# Patient Record
Sex: FEMALE | Race: BLACK OR AFRICAN AMERICAN | Employment: UNEMPLOYED | URBAN - METROPOLITAN AREA
[De-identification: names, ages, dates, MRNs, and addresses within clinical notes are randomized per-mention and may not be internally consistent; named-entity substitution may affect disease eponyms.]

---

## 2018-02-02 ENCOUNTER — APPOINTMENT (OUTPATIENT)
Dept: CT IMAGING | Age: 43
DRG: 871 | End: 2018-02-02
Attending: EMERGENCY MEDICINE
Payer: MEDICARE

## 2018-02-02 ENCOUNTER — HOSPITAL ENCOUNTER (INPATIENT)
Age: 43
LOS: 5 days | Discharge: HOME HEALTH | DRG: 871 | End: 2018-02-08
Attending: EMERGENCY MEDICINE | Admitting: INTERNAL MEDICINE
Payer: MEDICARE

## 2018-02-02 DIAGNOSIS — D57.00 SICKLE CELL CRISIS (HCC): ICD-10-CM

## 2018-02-02 DIAGNOSIS — R65.10 SIRS (SYSTEMIC INFLAMMATORY RESPONSE SYNDROME) (HCC): ICD-10-CM

## 2018-02-02 DIAGNOSIS — I48.0 PAROXYSMAL ATRIAL FIBRILLATION (HCC): ICD-10-CM

## 2018-02-02 DIAGNOSIS — R56.9 SEIZURES (HCC): ICD-10-CM

## 2018-02-02 DIAGNOSIS — D64.9 ANEMIA, UNSPECIFIED TYPE: ICD-10-CM

## 2018-02-02 DIAGNOSIS — R56.9 SEIZURE (HCC): Primary | ICD-10-CM

## 2018-02-02 DIAGNOSIS — D57.00 HB-SS DISEASE WITH CRISIS (HCC): ICD-10-CM

## 2018-02-02 LAB
ALBUMIN SERPL-MCNC: 2.8 G/DL (ref 3.5–5)
ALBUMIN/GLOB SERPL: 0.5 {RATIO} (ref 1.1–2.2)
ALP SERPL-CCNC: 163 U/L (ref 45–117)
ALT SERPL-CCNC: 43 U/L (ref 12–78)
ANION GAP SERPL CALC-SCNC: 13 MMOL/L (ref 5–15)
AST SERPL-CCNC: 158 U/L (ref 15–37)
BASOPHILS # BLD: 0 K/UL (ref 0–0.1)
BASOPHILS NFR BLD: 0 % (ref 0–1)
BILIRUB SERPL-MCNC: 2.9 MG/DL (ref 0.2–1)
BUN SERPL-MCNC: 40 MG/DL (ref 6–20)
BUN/CREAT SERPL: 20 (ref 12–20)
CALCIUM SERPL-MCNC: 8.2 MG/DL (ref 8.5–10.1)
CHLORIDE SERPL-SCNC: 103 MMOL/L (ref 97–108)
CO2 SERPL-SCNC: 21 MMOL/L (ref 21–32)
CREAT SERPL-MCNC: 2.04 MG/DL (ref 0.55–1.02)
DIFFERENTIAL METHOD BLD: ABNORMAL
EOSINOPHIL # BLD: 0 K/UL (ref 0–0.4)
EOSINOPHIL NFR BLD: 0 % (ref 0–7)
ERYTHROCYTE [DISTWIDTH] IN BLOOD BY AUTOMATED COUNT: 20.6 % (ref 11.5–14.5)
GLOBULIN SER CALC-MCNC: 5.2 G/DL (ref 2–4)
GLUCOSE SERPL-MCNC: 117 MG/DL (ref 65–100)
HCT VFR BLD AUTO: 17.8 % (ref 35–47)
HGB BLD-MCNC: 6 G/DL (ref 11.5–16)
IMM GRANULOCYTES # BLD: 0.6 K/UL (ref 0–0.04)
IMM GRANULOCYTES NFR BLD AUTO: 3 % (ref 0–0.5)
LDH SERPL L TO P-CCNC: 679 U/L (ref 81–246)
LYMPHOCYTES # BLD: 2.7 K/UL (ref 0.8–3.5)
LYMPHOCYTES NFR BLD: 13 % (ref 12–49)
MCH RBC QN AUTO: 25.1 PG (ref 26–34)
MCHC RBC AUTO-ENTMCNC: 33.7 G/DL (ref 30–36.5)
MCV RBC AUTO: 74.5 FL (ref 80–99)
MONOCYTES # BLD: 1.2 K/UL (ref 0–1)
MONOCYTES NFR BLD: 6 % (ref 5–13)
NEUTS SEG # BLD: 16 K/UL (ref 1.8–8)
NEUTS SEG NFR BLD: 78 % (ref 32–75)
NRBC # BLD: 0.06 K/UL (ref 0–0.01)
NRBC BLD-RTO: 0.3 PER 100 WBC
PLATELET # BLD AUTO: 425 K/UL (ref 150–400)
PMV BLD AUTO: 10.8 FL (ref 8.9–12.9)
POTASSIUM SERPL-SCNC: 3.6 MMOL/L (ref 3.5–5.1)
PROT SERPL-MCNC: 8 G/DL (ref 6.4–8.2)
RBC # BLD AUTO: 2.39 M/UL (ref 3.8–5.2)
RBC MORPH BLD: ABNORMAL
RETICS # AUTO: 0.19 M/UL (ref 0.02–0.08)
RETICS/RBC NFR AUTO: 8.1 % (ref 0.7–2.1)
SODIUM SERPL-SCNC: 137 MMOL/L (ref 136–145)
WBC # BLD AUTO: 20.5 K/UL (ref 3.6–11)

## 2018-02-02 PROCEDURE — 93005 ELECTROCARDIOGRAM TRACING: CPT

## 2018-02-02 PROCEDURE — 96365 THER/PROPH/DIAG IV INF INIT: CPT

## 2018-02-02 PROCEDURE — 86870 RBC ANTIBODY IDENTIFICATION: CPT | Performed by: EMERGENCY MEDICINE

## 2018-02-02 PROCEDURE — 99285 EMERGENCY DEPT VISIT HI MDM: CPT

## 2018-02-02 PROCEDURE — 85025 COMPLETE CBC W/AUTO DIFF WBC: CPT | Performed by: EMERGENCY MEDICINE

## 2018-02-02 PROCEDURE — 96361 HYDRATE IV INFUSION ADD-ON: CPT

## 2018-02-02 PROCEDURE — 85045 AUTOMATED RETICULOCYTE COUNT: CPT | Performed by: EMERGENCY MEDICINE

## 2018-02-02 PROCEDURE — 86880 COOMBS TEST DIRECT: CPT | Performed by: EMERGENCY MEDICINE

## 2018-02-02 PROCEDURE — 86850 RBC ANTIBODY SCREEN: CPT | Performed by: EMERGENCY MEDICINE

## 2018-02-02 PROCEDURE — 96375 TX/PRO/DX INJ NEW DRUG ADDON: CPT

## 2018-02-02 PROCEDURE — 36415 COLL VENOUS BLD VENIPUNCTURE: CPT | Performed by: EMERGENCY MEDICINE

## 2018-02-02 PROCEDURE — 74011000258 HC RX REV CODE- 258: Performed by: EMERGENCY MEDICINE

## 2018-02-02 PROCEDURE — 74011250636 HC RX REV CODE- 250/636

## 2018-02-02 PROCEDURE — 74011250636 HC RX REV CODE- 250/636: Performed by: EMERGENCY MEDICINE

## 2018-02-02 PROCEDURE — 86920 COMPATIBILITY TEST SPIN: CPT | Performed by: EMERGENCY MEDICINE

## 2018-02-02 PROCEDURE — 70450 CT HEAD/BRAIN W/O DYE: CPT

## 2018-02-02 PROCEDURE — 80053 COMPREHEN METABOLIC PANEL: CPT | Performed by: EMERGENCY MEDICINE

## 2018-02-02 PROCEDURE — 83615 LACTATE (LD) (LDH) ENZYME: CPT | Performed by: EMERGENCY MEDICINE

## 2018-02-02 PROCEDURE — 86902 BLOOD TYPE ANTIGEN DONOR EA: CPT | Performed by: EMERGENCY MEDICINE

## 2018-02-02 RX ORDER — MORPHINE SULFATE 100 MG/1
100 TABLET, FILM COATED, EXTENDED RELEASE ORAL EVERY 12 HOURS
COMMUNITY
End: 2018-02-08

## 2018-02-02 RX ORDER — LORAZEPAM 2 MG/ML
2 INJECTION INTRAMUSCULAR
Status: COMPLETED | OUTPATIENT
Start: 2018-02-02 | End: 2018-02-02

## 2018-02-02 RX ORDER — OXYCODONE HYDROCHLORIDE 30 MG/1
30 TABLET ORAL
COMMUNITY
End: 2018-02-08

## 2018-02-02 RX ORDER — LORAZEPAM 2 MG/ML
INJECTION INTRAMUSCULAR
Status: COMPLETED
Start: 2018-02-02 | End: 2018-02-02

## 2018-02-02 RX ORDER — SODIUM CHLORIDE 9 MG/ML
250 INJECTION, SOLUTION INTRAVENOUS AS NEEDED
Status: DISCONTINUED | OUTPATIENT
Start: 2018-02-02 | End: 2018-02-03 | Stop reason: SDUPTHER

## 2018-02-02 RX ADMIN — AMIODARONE HYDROCHLORIDE 1 MG/MIN: 50 INJECTION, SOLUTION INTRAVENOUS at 23:46

## 2018-02-02 RX ADMIN — LORAZEPAM 2 MG: 2 INJECTION INTRAMUSCULAR at 21:49

## 2018-02-02 RX ADMIN — LORAZEPAM 2 MG: 2 INJECTION INTRAMUSCULAR; INTRAVENOUS at 21:49

## 2018-02-02 RX ADMIN — AMIODARONE HYDROCHLORIDE 150 MG: 50 INJECTION, SOLUTION INTRAVENOUS at 23:30

## 2018-02-02 RX ADMIN — SODIUM CHLORIDE 1000 ML: 900 INJECTION, SOLUTION INTRAVENOUS at 22:09

## 2018-02-02 NOTE — IP AVS SNAPSHOT
[facility] [address] [facility] 
[phone] Patient: Vic Perdue MRN: [id number] :1975 About your hospitalization You were admitted on:  February 3, 2018 You last received care in the:  hospitals 2 PROGRESSIVE CARE You were discharged on:  2018 Why you were hospitalized Your primary diagnosis was:  Not on File Your diagnoses also included:  Sepsis (Hcc), Sickle Cell Crisis (Hcc), Sickle Cell Anemia (Hcc), Leg Wound, Left, Abnormal Lfts, A-Fib (Hcc), Multiple Myeloma (Hcc), Seizures (Hcc) Follow-up Information Follow up With Details Comments Contact Info Mukesh Aleman MD Go on 2018 For new patient/ hospital follow up appointment at 12:15PM  [address] 
[phone] Janette Bledsoe MD Go on 3/8/2018 For hospital follow up appointment at 3:00PM  [address] [facility] 
[phone] [address] [facility] will see you for pt/ot/nursing and aide when discharged. [phone] Your Scheduled Appointments 2018 12:15 PM EST TRANSITIONAL CARE MANAGEMENT with Mukesh Aleman MD  
[facility] [address] [facility]  
[phone]   3:00 PM EST  
ESTABLISHED PATIENT with Janette Bledsoe MD  
[facility]) [address] [facility]  
[phone] Discharge Orders None A check angela indicates which time of day the medication should be taken. My Medications START taking these medications Instructions Each Dose to Equal  
 Morning Noon Evening Bedtime  
 amiodarone 400 mg tablet Commonly known as:  Lexi Cosby Your last dose was: Your next dose is: Take 1 Tab by mouth two (2) times a day. 400 mg  
    
   
   
   
  
 ciprofloxacin HCl 500 mg tablet Commonly known as:  CIPRO Your last dose was: Your next dose is: Take 1 Tab by mouth every twelve (12) hours for 8 days. 500 mg  
    
   
   
   
  
 clindamycin 300 mg capsule Commonly known as:  CLEOCIN Your last dose was: Your next dose is: Take 1 Cap by mouth every eight (8) hours for 8 days. 300 mg  
    
   
   
   
  
 levETIRAcetam 250 mg tablet Commonly known as:  KEPPRA Your last dose was: Your next dose is: Take 1 Tab by mouth two (2) times a day. 250 mg  
    
   
   
   
  
 ondansetron hcl 4 mg tablet Commonly known as:  ZOFRAN (AS HYDROCHLORIDE) Your last dose was: Your next dose is: Take 1 Tab by mouth every six (6) hours as needed for Nausea. 4 mg CHANGE how you take these medications Instructions Each Dose to Equal  
 Morning Noon Evening Bedtime  
 oxyCODONE IR 15 mg immediate release tablet Commonly known as:  OXY-IR What changed:   
- medication strength 
- how much to take 
- reasons to take this Your last dose was: Your next dose is: Take 1 Tab by mouth every four (4) hours as needed. Max Daily Amount: 90 mg.  
 15 mg CONTINUE taking these medications Instructions Each Dose to Equal  
 Morning Noon Evening Bedtime  
 folic acid 1 mg tablet Commonly known as:  Google Your last dose was: Your next dose is: Take 1 mg by mouth daily. 1 mg  
    
   
   
   
  
 gabapentin 300 mg capsule Commonly known as:  NEURONTIN Your last dose was: Your next dose is: Take 300 mg by mouth nightly. 300 mg  
    
   
   
   
  
  
STOP taking these medications   
 morphine  mg CR tablet Commonly known as:  MS CONTIN  
   
  
 rivaroxaban 20 mg Tab tablet Commonly known as:  Ubaldopaul Eddy Where to Get Your Medications Information on where to get these meds will be given to you by the nurse or doctor. ! Ask your nurse or doctor about these medications  
  amiodarone 400 mg tablet  
 ciprofloxacin HCl 500 mg tablet  
 clindamycin 300 mg capsule  
 levETIRAcetam 250 mg tablet  
 ondansetron hcl 4 mg tablet  
 oxyCODONE IR 15 mg immediate release tablet Discharge Instructions Sickle Cell Crisis: Care Instructions Your Care Instructions Sickle cell crisis is a painful episode that may begin suddenly in a person with sickle cell disease. Sickle cell disease turns normal, round red blood cells into cells that look like kendra or crescent moons. The sickle-shaped cells can get stuck in blood vessels, blocking blood flow and causing severe pain. The pain can occur in the bones of the spine, the arms and legs, the chest, and the abdomen. An episode may be called a \"painful event\" or \"painful crisis. \" Some people who have sickle cell disease have many painful events, while others have few or none. Treatment depends on the level of pain and how long it lasts. Sometimes taking nonprescription pain relievers can help. Or you may need stronger pain relief medicine that is prescribed or given by a doctor. You may need to be treated in the hospital. 
It isn't always possible to know what sets off a painful event. But triggers include being dehydrated, cold temperatures, infection, stress, and not getting enough oxygen. Follow-up care is a key part of your treatment and safety. Be sure to make and go to all appointments, and call your doctor if you are having problems. It's also a good idea to know your test results and keep a list of the medicines you take. How can you care for yourself at home? · Create a pain management plan with your doctor. This plan should include the types of medicines you can take and other actions you can take at home to relieve pain. · Drink plenty of fluids, enough so that your urine is light yellow or clear like water. If you have kidney, heart, or liver disease and have to limit fluids, talk with your doctor before you increase the amount of fluids you drink. · Take your medicines exactly as prescribed. Call your doctor if you think you are having a problem with your medicine. · Take pain medicines exactly as directed. ¨ If the doctor gave you a prescription medicine for pain, take it as prescribed. ¨ If you are not taking a prescription pain medicine, ask your doctor if you can take an over-the-counter medicine. · Avoid alcohol. It can make you dehydrated. · Dress warmly in cold weather. The cold and windy weather can lead to severe pain. · Do not smoke. Smoking can reduce the amount of oxygen in your blood. · Get plenty of sleep. When should you call for help? Call 911 anytime you think you may need emergency care. For example, call if: 
? · You have symptoms of a severe problem from sickle cell. ? · You have symptoms of a stroke. These may include: 
¨ Sudden numbness, tingling, weakness, or loss of movement in your face, arm, or leg, especially on only one side of your body. ¨ Sudden vision changes. ¨ Sudden trouble speaking. ¨ Sudden confusion or trouble understanding simple statements. ¨ Sudden problems with walking or balance. ¨ A sudden, severe headache that is different from past headaches. ? · You are in severe pain. ? · You have symptoms of a heart attack. These may include: ¨ Chest pain or pressure, or a strange feeling in the chest. 
¨ Sweating. ¨ Shortness of breath. ¨ Nausea or vomiting. ¨ Pain, pressure, or a strange feeling in the back, neck, jaw, or upper belly or in one or both shoulders or arms. ¨ Lightheadedness or sudden weakness. ¨ A fast or irregular heartbeat. After you call 911, the  may tell you to chew 1 adult-strength or 2 to 4 low-dose aspirin. Wait for an ambulance. Do not try to drive yourself. ?Call your doctor now or seek immediate medical care if: 
? · You have a fever. ? Watch closely for changes in your health, and be sure to contact your doctor if you have any problems. Where can you learn more? Go to http://leni-sydney.info/. Enter F104 in the search box to learn more about \"Sickle Cell Crisis: Care Instructions. \" Current as of: 2016 Content Version: 11.4 © 3700-8797 [employer] (Formerly [employer]). Care instructions adapted under license by [employer] (which disclaims liability or warranty for this information). If you have questions about a medical condition or this instruction, always ask your healthcare professional. [place] [address] any warranty or liability for your use of this information. [facility] Activation Thank you for requesting access to [facility]. Please follow the instructions below to securely access and download your online medical record. [facility] allows you to send messages to your doctor, view your test results, renew your prescriptions, schedule appointments, and more. How Do I Sign Up? 1. In your internet browser, go to [email or web address] 
2. Click on the First Time User? Click Here link in the Sign In box. You will be redirect to the New Member Sign Up page. 3. Enter your [facility] Access Code exactly as it appears below. You will not need to use this code after youve completed the sign-up process. If you do not sign up before the expiration date, you must request a new code. [facility] Access Code: [address] Expires: 2018 11:44 AM (This is the date your [facility] access code will ) 4.  Enter the last four digits of your Social Security Number (xxxx) and Date of Birth (mm/dd/yyyy) as indicated and click Submit. You will be taken to the next sign-up page. 5. Create a Kmsocial ID. This will be your [employer] login ID and cannot be changed, so think of one that is secure and easy to remember. 6. Create a ConsiderCt password. You can change your password at any time. 7. Enter your Password Reset Question and Answer. This can be used at a later time if you forget your password. 8. Enter your e-mail address. You will receive e-mail notification when new information is available in [address] 9. Click Sign Up. You can now view and download portions of your medical record. 10. Click the Download Summary menu link to download a portable copy of your medical information. Additional Information If you have questions, please visit the Frequently Asked Questions section of the [employer] website at [email or web address] Remember, Kms[employer] is NOT to be used for urgent needs. For medical emergencies, dial 911. [employer] Announcement We are excited to announce that we are making your provider's discharge notes available to you in Kmsocial. You will see these notes when they are completed and signed by the physician that discharged you from your recent hospital stay. If you have any questions or concerns about any information you see in Kmsocial, please call the Health Information Department where you were seen or reach out to your Primary Care Provider for more information about your plan of care. Introducing [facility]! Tahir Davis introduces Kmsocial patient portal. Now you can access parts of your medical record, email your doctor's office, and request medication refills online. 1. In your internet browser, go to [email or web address] 2. Click on the First Time User? Click Here link in the Sign In box. You will see the New Member Sign Up page. 3. Enter your [employer] Access Code exactly as it appears below. You will not need to use this code after youve completed the sign-up process. If you do not sign up before the expiration date, you must request a new code. · [employer] Access Code: [address] Expires: 5/9/2018 11:44 AM 
 
4. Enter the last four digits of your Social Security Number (xxxx) and Date of Birth (mm/dd/yyyy) as indicated and click Submit. You will be taken to the next sign-up page. 5. Create a [employer] ID. This will be your [employer] login ID and cannot be changed, so think of one that is secure and easy to remember. 6. Create a [employer] password. You can change your password at any time. 7. Enter your Password Reset Question and Answer. This can be used at a later time if you forget your password. 8. Enter your e-mail address. You will receive e-mail notification when new information is available in [address] 9. Click Sign Up. You can now view and download portions of your medical record. 10. Click the Download Summary menu link to download a portable copy of your medical information. If you have questions, please visit the Frequently Asked Questions section of the [employer] website. Remember, [employer] is NOT to be used for urgent needs. For medical emergencies, dial 911. Now available from your iPhone and Android! Unresulted Labs-Please follow up with your PCP about these lab tests Order Current Status CULTURE, BLOOD Preliminary result CULTURE, BLOOD Preliminary result Providers Seen During Your Hospitalization Provider Specialty Primary office phone Isiah Bentley. Mark Burr MD Emergency Medicine [phone] Bennie Celis MD Internal Medicine [phone] Landon Sebastian MD Internal Medicine [phone] Your Primary Care Physician (PCP) Primary Care Physician Office Phone Office Fax NONE ** None ** ** None ** You are allergic to the following Allergen Reactions Pcn (Penicillins) Hives Bactrim (Sulfamethoxazole-Trimethoprim) Nausea and Vomiting Silvadene (Silver Sulfadiazine) Hives Zantac (Ranitidine Hcl) Itching Recent Documentation Height Weight BMI OB Status Smoking Status 1.626 m 45.4 kg 17.17 kg/m2 Postmenopausal Never Smoker Emergency Contacts Name Discharge Info Relation Home Work Mobile Tiara Carrasco N/A  AT THIS TIME [6] Other Relative [6] [phone] Patient Belongings The following personal items are in your possession at time of discharge: 
  Dental Appliances: None  Visual Aid: At home, Contacts      Home Medications: None   Jewelry: None  Clothing: Shirt, Undergarments, Pants    Other Valuables: None Please provide this summary of care documentation to your next provider. Signatures-by signing, you are acknowledging that this After Visit Summary has been reviewed with you and you have received a copy. Patient Signature:  ____________________________________________________________ Date:  ____________________________________________________________  
  
Marlyse Leaks Provider Signature:  ____________________________________________________________ Date:  ____________________________________________________________

## 2018-02-02 NOTE — IP AVS SNAPSHOT
[address] 
[phone] Patient: Whitley Hernández MRN: [id number] :1975 About your hospitalization You were admitted on:  February 3, 2018 You last received care in the:  [facility] 2 PROGRESSIVE CARE You were discharged on:  2018 Why you were hospitalized Your primary diagnosis was:  Not on File Your diagnoses also included:  Sepsis (Hcc), Sickle Cell Crisis (Hcc), Sickle Cell Anemia (Hcc), Leg Wound, Left, Abnormal Lfts, A-Fib (Hcc), Multiple Myeloma (Hcc), Seizures (Hcc) Follow-up Information Follow up With Details Comments Contact Info Michelle Nagel MD Go on 2018 For new patient/ hospital follow up appointment at 12:15PM  [place] [address] [address] 
[phone] Jacques Miller MD Go on 3/8/2018 For hospital follow up appointment at 3:00PM  [address] [facility] [address] 
[phone] [address] [place] [address] will see you for pt/ot/nursing and aide when discharged. [phone] Your Scheduled Appointments 2018 12:15 PM EST TRANSITIONAL CARE MANAGEMENT with Michelle Nagel MD  
[facility] at [address]  
[phone]   3:00 PM EST  
ESTABLISHED PATIENT with Jacques Miller MD  
[facility] [address] [place] [address]) [address] [facility] [address]  
[phone] Discharge Orders None A check angela indicates which time of day the medication should be taken. My Medications START taking these medications Instructions Each Dose to Equal  
 Morning Noon Evening Bedtime  
 amiodarone 400 mg tablet Commonly known as:  Rashida Lacy Your last dose was: Your next dose is: Take 1 Tab by mouth two (2) times a day. 400 mg  
    
   
   
   
  
 ciprofloxacin HCl 500 mg tablet Commonly known as:  CIPRO Your last dose was: Your next dose is: Take 1 Tab by mouth every twelve (12) hours for 8 days. 500 mg  
    
   
   
   
  
 clindamycin 300 mg capsule Commonly known as:  CLEOCIN Your last dose was: Your next dose is: Take 1 Cap by mouth every eight (8) hours for 8 days. 300 mg  
    
   
   
   
  
 levETIRAcetam 250 mg tablet Commonly known as:  KEPPRA Your last dose was: Your next dose is: Take 1 Tab by mouth two (2) times a day. 250 mg  
    
   
   
   
  
 ondansetron hcl 4 mg tablet Commonly known as:  ZOFRAN (AS HYDROCHLORIDE) Your last dose was: Your next dose is: Take 1 Tab by mouth every six (6) hours as needed for Nausea. 4 mg CHANGE how you take these medications Instructions Each Dose to Equal  
 Morning Noon Evening Bedtime  
 oxyCODONE IR 15 mg immediate release tablet Commonly known as:  OXY-IR What changed:   
- medication strength 
- how much to take 
- reasons to take this Your last dose was: Your next dose is: Take 1 Tab by mouth every four (4) hours as needed. Max Daily Amount: 90 mg.  
 15 mg CONTINUE taking these medications Instructions Each Dose to Equal  
 Morning Noon Evening Bedtime  
 folic acid 1 mg tablet Commonly known as:  [employer] Your last dose was: Your next dose is: Take 1 mg by mouth daily. 1 mg  
    
   
   
   
  
 gabapentin 300 mg capsule Commonly known as:  NEURONTIN Your last dose was: Your next dose is: Take 300 mg by mouth nightly. 300 mg  
    
   
   
   
  
  
STOP taking these medications   
 morphine  mg CR tablet Commonly known as:  MS CONTIN  
   
  
 rivaroxaban 20 mg Tab tablet Commonly known as:  Tiffanie King Where to Get Your Medications Information on where to get these meds will be given to you by the nurse or doctor. ! Ask your nurse or doctor about these medications  
  amiodarone 400 mg tablet  
 ciprofloxacin HCl 500 mg tablet  
 clindamycin 300 mg capsule  
 levETIRAcetam 250 mg tablet  
 ondansetron hcl 4 mg tablet  
 oxyCODONE IR 15 mg immediate release tablet Discharge Instructions Sickle Cell Crisis: Care Instructions Your Care Instructions Sickle cell crisis is a painful episode that may begin suddenly in a person with sickle cell disease. Sickle cell disease turns normal, round red blood cells into cells that look like kendra or crescent moons. The sickle-shaped cells can get stuck in blood vessels, blocking blood flow and causing severe pain. The pain can occur in the bones of the spine, the arms and legs, the chest, and the abdomen. An episode may be called a \"painful event\" or \"painful crisis. \" Some people who have sickle cell disease have many painful events, while others have few or none. Treatment depends on the level of pain and how long it lasts. Sometimes taking nonprescription pain relievers can help. Or you may need stronger pain relief medicine that is prescribed or given by a doctor. You may need to be treated in the hospital. 
It isn't always possible to know what sets off a painful event. But triggers include being dehydrated, cold temperatures, infection, stress, and not getting enough oxygen. Follow-up care is a key part of your treatment and safety. Be sure to make and go to all appointments, and call your doctor if you are having problems. It's also a good idea to know your test results and keep a list of the medicines you take. How can you care for yourself at home? · Create a pain management plan with your doctor. This plan should include the types of medicines you can take and other actions you can take at home to relieve pain. · Drink plenty of fluids, enough so that your urine is light yellow or clear like water. If you have kidney, heart, or liver disease and have to limit fluids, talk with your doctor before you increase the amount of fluids you drink. · Take your medicines exactly as prescribed. Call your doctor if you think you are having a problem with your medicine. · Take pain medicines exactly as directed. ¨ If the doctor gave you a prescription medicine for pain, take it as prescribed. ¨ If you are not taking a prescription pain medicine, ask your doctor if you can take an over-the-counter medicine. · Avoid alcohol. It can make you dehydrated. · Dress warmly in cold weather. The cold and windy weather can lead to severe pain. · Do not smoke. Smoking can reduce the amount of oxygen in your blood. · Get plenty of sleep. When should you call for help? Call 911 anytime you think you may need emergency care. For example, call if: 
? · You have symptoms of a severe problem from sickle cell. ? · You have symptoms of a stroke. These may include: 
¨ Sudden numbness, tingling, weakness, or loss of movement in your face, arm, or leg, especially on only one side of your body. ¨ Sudden vision changes. ¨ Sudden trouble speaking. ¨ Sudden confusion or trouble understanding simple statements. ¨ Sudden problems with walking or balance. ¨ A sudden, severe headache that is different from past headaches. ? · You are in severe pain. ? · You have symptoms of a heart attack. These may include: ¨ Chest pain or pressure, or a strange feeling in the chest. 
¨ Sweating. ¨ Shortness of breath. ¨ Nausea or vomiting. ¨ Pain, pressure, or a strange feeling in the back, neck, jaw, or upper belly or in one or both shoulders or arms. ¨ Lightheadedness or sudden weakness. ¨ A fast or irregular heartbeat. After you call 911, the  may tell you to chew 1 adult-strength or 2 to 4 low-dose aspirin. Wait for an ambulance. Do not try to drive yourself. ?Call your doctor now or seek immediate medical care if: 
? · You have a fever. ? Watch closely for changes in your health, and be sure to contact your doctor if you have any problems. Where can you learn more? Go to http://leni-sydney.info/. Enter F104 in the search box to learn more about \"Sickle Cell Crisis: Care Instructions. \" Current as of: 2016 Content Version: 11.4 © 0976-9924 [employer]. Care instructions adapted under license by [employer] (which disclaims liability or warranty for this information). If you have questions about a medical condition or this instruction, always ask your healthcare professional. [place] [address] any warranty or liability for your use of this information. [employer] Activation Thank you for requesting access to [employer]. Please follow the instructions below to securely access and download your online medical record. [facility] allows you to send messages to your doctor, view your test results, renew your prescriptions, schedule appointments, and more. How Do I Sign Up? 1. In your internet browser, go to [email or web address] 
2. Click on the First Time User? Click Here link in the Sign In box. You will be redirect to the New Member Sign Up page. 3. Enter your [employer] Access Code exactly as it appears below. You will not need to use this code after youve completed the sign-up process. If you do not sign up before the expiration date, you must request a new code. [employer] Access Code: [address] Expires: 2018 11:44 AM (This is the date your [employer] access code will ) 4.  Enter the last four digits of your Social Security Number (xxxx) and Date of Birth (mm/dd/yyyy) as indicated and click Submit. You will be taken to the next sign-up page. 5. Create a Spunkmobile ID. This will be your Spunkmobile login ID and cannot be changed, so think of one that is secure and easy to remember. 6. Create a [employer] password. You can change your password at any time. 7. Enter your Password Reset Question and Answer. This can be used at a later time if you forget your password. 8. Enter your e-mail address. You will receive e-mail notification when new information is available in [address] 9. Click Sign Up. You can now view and download portions of your medical record. 10. Click the Download Summary menu link to download a portable copy of your medical information. Additional Information If you have questions, please visit the Frequently Asked Questions section of the Spunkmobile website at [email or web address] Remember, Spunkmobile is NOT to be used for urgent needs. For medical emergencies, dial 911. Spunkmobile Announcement We are excited to announce that we are making your provider's discharge notes available to you in Spunkmobile. You will see these notes when they are completed and signed by the physician that discharged you from your recent hospital stay. If you have any questions or concerns about any information you see in Spunkmobile, please call the Health Information Department where you were seen or reach out to your Primary Care Provider for more information about your plan of care. Introducing [facility]! [facility] introduces Spunkmobile patient portal. Now you can access parts of your medical record, email your doctor's office, and request medication refills online. 1. In your internet browser, go to [email or web address] 2. Click on the First Time User? Click Here link in the Sign In box. You will see the New Member Sign Up page. 3. Enter your [employer] Access Code exactly as it appears below. You will not need to use this code after youve completed the sign-up process. If you do not sign up before the expiration date, you must request a new code. · [employer] Access Code: [address] Expires: 5/9/2018 11:44 AM 
 
4. Enter the last four digits of your Social Security Number (xxxx) and Date of Birth (mm/dd/yyyy) as indicated and click Submit. You will be taken to the next sign-up page. 5. Create a Q[employer]anking ID. This will be your [employer] login ID and cannot be changed, so think of one that is secure and easy to remember. 6. Create a [employer]anking password. You can change your password at any time. 7. Enter your Password Reset Question and Answer. This can be used at a later time if you forget your password. 8. Enter your e-mail address. You will receive e-mail notification when new information is available in [address] 9. Click Sign Up. You can now view and download portions of your medical record. 10. Click the Download Summary menu link to download a portable copy of your medical information. If you have questions, please visit the Frequently Asked Questions section of the [employer] website. Remember, [employer] is NOT to be used for urgent needs. For medical emergencies, dial 911. Now available from your iPhone and Android! Unresulted Labs-Please follow up with your PCP about these lab tests Order Current Status CULTURE, BLOOD Preliminary result CULTURE, BLOOD Preliminary result Providers Seen During Your Hospitalization Provider Specialty Primary office phone Heather Rodriguez MD Emergency Medicine [phone] Viktoria Johnson MD Internal Medicine [phone] Mallorie Yan MD Internal Medicine [phone] Your Primary Care Physician (PCP) Primary Care Physician Office Phone Office Fax NONE ** None ** ** None ** You are allergic to the following Allergen Reactions Pcn (Penicillins) Hives Bactrim (Sulfamethoxazole-Trimethoprim) Nausea and Vomiting Silvadene (Silver Sulfadiazine) Hives Zantac (Ranitidine Hcl) Itching Recent Documentation Height Weight BMI OB Status Smoking Status 1.626 m 45.4 kg 17.17 kg/m2 Postmenopausal Never Smoker Emergency Contacts Name Discharge Info Relation Home Work Mobile Tiara Carrasco N/A  AT THIS TIME [6] Other Relative [6] [phone] Patient Belongings The following personal items are in your possession at time of discharge: 
  Dental Appliances: None  Visual Aid: At home, Contacts      Home Medications: None   Jewelry: None  Clothing: Shirt, Undergarments, Pants    Other Valuables: None Please provide this summary of care documentation to your next provider. Signatures-by signing, you are acknowledging that this After Visit Summary has been reviewed with you and you have received a copy. Patient Signature:  ____________________________________________________________ Date:  ____________________________________________________________  
  
Liz Medico Provider Signature:  ____________________________________________________________ Date:  ____________________________________________________________

## 2018-02-03 ENCOUNTER — APPOINTMENT (OUTPATIENT)
Dept: GENERAL RADIOLOGY | Age: 43
DRG: 871 | End: 2018-02-03
Attending: INTERNAL MEDICINE
Payer: MEDICARE

## 2018-02-03 PROBLEM — R56.9 SEIZURES (HCC): Status: ACTIVE | Noted: 2018-02-03

## 2018-02-03 PROBLEM — I48.91 A-FIB (HCC): Status: ACTIVE | Noted: 2018-02-03

## 2018-02-03 PROBLEM — R79.89 ABNORMAL LFTS: Status: ACTIVE | Noted: 2018-02-03

## 2018-02-03 PROBLEM — C90.00 MULTIPLE MYELOMA (HCC): Status: ACTIVE | Noted: 2018-02-03

## 2018-02-03 PROBLEM — S81.802A LEG WOUND, LEFT: Status: ACTIVE | Noted: 2018-02-03

## 2018-02-03 LAB
ALBUMIN SERPL-MCNC: 2.1 G/DL (ref 3.5–5)
ALBUMIN/GLOB SERPL: 0.5 {RATIO} (ref 1.1–2.2)
ALP SERPL-CCNC: 149 U/L (ref 45–117)
ALT SERPL-CCNC: 41 U/L (ref 12–78)
ANION GAP SERPL CALC-SCNC: 9 MMOL/L (ref 5–15)
APPEARANCE UR: ABNORMAL
AST SERPL-CCNC: 130 U/L (ref 15–37)
ATRIAL RATE: 136 BPM
BACTERIA URNS QL MICRO: NEGATIVE /HPF
BASOPHILS # BLD: 0 K/UL (ref 0–0.1)
BASOPHILS NFR BLD: 0 % (ref 0–1)
BILIRUB SERPL-MCNC: 2.2 MG/DL (ref 0.2–1)
BILIRUB UR QL CFM: NEGATIVE
BUN SERPL-MCNC: 38 MG/DL (ref 6–20)
BUN/CREAT SERPL: 26 (ref 12–20)
CALCIUM SERPL-MCNC: 7.3 MG/DL (ref 8.5–10.1)
CALCULATED R AXIS, ECG10: 51 DEGREES
CALCULATED T AXIS, ECG11: -10 DEGREES
CHLORIDE SERPL-SCNC: 108 MMOL/L (ref 97–108)
CO2 SERPL-SCNC: 22 MMOL/L (ref 21–32)
COLOR UR: ABNORMAL
CREAT SERPL-MCNC: 1.47 MG/DL (ref 0.55–1.02)
DIAGNOSIS, 93000: NORMAL
DIFFERENTIAL METHOD BLD: ABNORMAL
EOSINOPHIL # BLD: 0 K/UL (ref 0–0.4)
EOSINOPHIL NFR BLD: 0 % (ref 0–7)
EPITH CASTS URNS QL MICRO: ABNORMAL /LPF
ERYTHROCYTE [DISTWIDTH] IN BLOOD BY AUTOMATED COUNT: 20.8 % (ref 11.5–14.5)
GLOBULIN SER CALC-MCNC: 4.4 G/DL (ref 2–4)
GLUCOSE SERPL-MCNC: 97 MG/DL (ref 65–100)
GLUCOSE UR STRIP.AUTO-MCNC: NEGATIVE MG/DL
HCT VFR BLD AUTO: 15.2 % (ref 35–47)
HCT VFR BLD AUTO: 17.1 % (ref 35–47)
HCT VFR BLD AUTO: 20.8 % (ref 35–47)
HGB BLD-MCNC: 5.2 G/DL (ref 11.5–16)
HGB BLD-MCNC: 5.7 G/DL (ref 11.5–16)
HGB BLD-MCNC: 6.9 G/DL (ref 11.5–16)
HGB UR QL STRIP: NEGATIVE
HYALINE CASTS URNS QL MICRO: ABNORMAL /LPF (ref 0–5)
IMM GRANULOCYTES # BLD: 0 K/UL (ref 0–0.04)
IMM GRANULOCYTES NFR BLD AUTO: 0 % (ref 0–0.5)
KETONES UR QL STRIP.AUTO: NEGATIVE MG/DL
LEUKOCYTE ESTERASE UR QL STRIP.AUTO: NEGATIVE
LYMPHOCYTES # BLD: 1 K/UL (ref 0.8–3.5)
LYMPHOCYTES NFR BLD: 6 % (ref 12–49)
MCH RBC QN AUTO: 25.6 PG (ref 26–34)
MCHC RBC AUTO-ENTMCNC: 34.2 G/DL (ref 30–36.5)
MCV RBC AUTO: 74.9 FL (ref 80–99)
MONOCYTES # BLD: 0.6 K/UL (ref 0–1)
MONOCYTES NFR BLD: 4 % (ref 5–13)
NEUTS BAND NFR BLD MANUAL: 4 %
NEUTS SEG # BLD: 14.3 K/UL (ref 1.8–8)
NEUTS SEG NFR BLD: 86 % (ref 32–75)
NITRITE UR QL STRIP.AUTO: NEGATIVE
NRBC # BLD: 0.06 K/UL (ref 0–0.01)
NRBC BLD-RTO: 0.4 PER 100 WBC
PH UR STRIP: 5.5 [PH] (ref 5–8)
PLATELET # BLD AUTO: 377 K/UL (ref 150–400)
PMV BLD AUTO: 11 FL (ref 8.9–12.9)
POTASSIUM SERPL-SCNC: 3.8 MMOL/L (ref 3.5–5.1)
PROT SERPL-MCNC: 6.5 G/DL (ref 6.4–8.2)
PROT UR STRIP-MCNC: ABNORMAL MG/DL
Q-T INTERVAL, ECG07: 316 MS
QRS DURATION, ECG06: 92 MS
QTC CALCULATION (BEZET), ECG08: 489 MS
RBC # BLD AUTO: 2.03 M/UL (ref 3.8–5.2)
RBC #/AREA URNS HPF: ABNORMAL /HPF (ref 0–5)
RBC MORPH BLD: ABNORMAL
SODIUM SERPL-SCNC: 139 MMOL/L (ref 136–145)
SP GR UR REFRACTOMETRY: 1.01 (ref 1–1.03)
UA: UC IF INDICATED,UAUC: ABNORMAL
UROBILINOGEN UR QL STRIP.AUTO: 1 EU/DL (ref 0.2–1)
VENTRICULAR RATE, ECG03: 144 BPM
WBC # BLD AUTO: 15.9 K/UL (ref 3.6–11)
WBC URNS QL MICRO: ABNORMAL /HPF (ref 0–4)

## 2018-02-03 PROCEDURE — C1751 CATH, INF, PER/CENT/MIDLINE: HCPCS

## 2018-02-03 PROCEDURE — 80053 COMPREHEN METABOLIC PANEL: CPT | Performed by: INTERNAL MEDICINE

## 2018-02-03 PROCEDURE — 74011250636 HC RX REV CODE- 250/636: Performed by: EMERGENCY MEDICINE

## 2018-02-03 PROCEDURE — 95816 EEG AWAKE AND DROWSY: CPT | Performed by: INTERNAL MEDICINE

## 2018-02-03 PROCEDURE — 74011000258 HC RX REV CODE- 258: Performed by: INTERNAL MEDICINE

## 2018-02-03 PROCEDURE — 86902 BLOOD TYPE ANTIGEN DONOR EA: CPT | Performed by: EMERGENCY MEDICINE

## 2018-02-03 PROCEDURE — 96366 THER/PROPH/DIAG IV INF ADDON: CPT

## 2018-02-03 PROCEDURE — 30233N1 TRANSFUSION OF NONAUTOLOGOUS RED BLOOD CELLS INTO PERIPHERAL VEIN, PERCUTANEOUS APPROACH: ICD-10-PCS | Performed by: GENERAL ACUTE CARE HOSPITAL

## 2018-02-03 PROCEDURE — 86921 COMPATIBILITY TEST INCUBATE: CPT | Performed by: EMERGENCY MEDICINE

## 2018-02-03 PROCEDURE — 65660000000 HC RM CCU STEPDOWN

## 2018-02-03 PROCEDURE — 74011250636 HC RX REV CODE- 250/636: Performed by: INTERNAL MEDICINE

## 2018-02-03 PROCEDURE — 36415 COLL VENOUS BLD VENIPUNCTURE: CPT | Performed by: INTERNAL MEDICINE

## 2018-02-03 PROCEDURE — 93306 TTE W/DOPPLER COMPLETE: CPT

## 2018-02-03 PROCEDURE — 96365 THER/PROPH/DIAG IV INF INIT: CPT

## 2018-02-03 PROCEDURE — 87205 SMEAR GRAM STAIN: CPT | Performed by: INTERNAL MEDICINE

## 2018-02-03 PROCEDURE — 74011000250 HC RX REV CODE- 250: Performed by: INTERNAL MEDICINE

## 2018-02-03 PROCEDURE — 81001 URINALYSIS AUTO W/SCOPE: CPT | Performed by: INTERNAL MEDICINE

## 2018-02-03 PROCEDURE — 71045 X-RAY EXAM CHEST 1 VIEW: CPT

## 2018-02-03 PROCEDURE — 77030018719 HC DRSG PTCH ANTIMIC J&J -A

## 2018-02-03 PROCEDURE — 76937 US GUIDE VASCULAR ACCESS: CPT

## 2018-02-03 PROCEDURE — 87186 SC STD MICRODIL/AGAR DIL: CPT | Performed by: INTERNAL MEDICINE

## 2018-02-03 PROCEDURE — 51702 INSERT TEMP BLADDER CATH: CPT

## 2018-02-03 PROCEDURE — 85660 RBC SICKLE CELL TEST: CPT | Performed by: EMERGENCY MEDICINE

## 2018-02-03 PROCEDURE — 77030005514 HC CATH URETH FOL14 BARD -A

## 2018-02-03 PROCEDURE — 96361 HYDRATE IV INFUSION ADD-ON: CPT

## 2018-02-03 PROCEDURE — 77030018786 HC NDL GD F/USND BARD -B

## 2018-02-03 PROCEDURE — 74011250637 HC RX REV CODE- 250/637: Performed by: INTERNAL MEDICINE

## 2018-02-03 PROCEDURE — P9016 RBC LEUKOCYTES REDUCED: HCPCS | Performed by: EMERGENCY MEDICINE

## 2018-02-03 PROCEDURE — 87040 BLOOD CULTURE FOR BACTERIA: CPT | Performed by: INTERNAL MEDICINE

## 2018-02-03 PROCEDURE — 85025 COMPLETE CBC W/AUTO DIFF WBC: CPT | Performed by: INTERNAL MEDICINE

## 2018-02-03 PROCEDURE — 85018 HEMOGLOBIN: CPT | Performed by: INTERNAL MEDICINE

## 2018-02-03 PROCEDURE — 87077 CULTURE AEROBIC IDENTIFY: CPT | Performed by: INTERNAL MEDICINE

## 2018-02-03 PROCEDURE — 74011250637 HC RX REV CODE- 250/637: Performed by: PSYCHIATRY & NEUROLOGY

## 2018-02-03 PROCEDURE — 86922 COMPATIBILITY TEST ANTIGLOB: CPT | Performed by: EMERGENCY MEDICINE

## 2018-02-03 PROCEDURE — 36430 TRANSFUSION BLD/BLD COMPNT: CPT

## 2018-02-03 RX ORDER — FOLIC ACID 1 MG/1
1 TABLET ORAL DAILY
Status: DISCONTINUED | OUTPATIENT
Start: 2018-02-03 | End: 2018-02-08 | Stop reason: HOSPADM

## 2018-02-03 RX ORDER — SODIUM CHLORIDE 0.9 % (FLUSH) 0.9 %
5-10 SYRINGE (ML) INJECTION AS NEEDED
Status: DISCONTINUED | OUTPATIENT
Start: 2018-02-03 | End: 2018-02-08 | Stop reason: HOSPADM

## 2018-02-03 RX ORDER — GABAPENTIN 300 MG/1
300 CAPSULE ORAL
COMMUNITY

## 2018-02-03 RX ORDER — LEVALBUTEROL INHALATION SOLUTION 1.25 MG/3ML
1.25 SOLUTION RESPIRATORY (INHALATION)
Status: DISCONTINUED | OUTPATIENT
Start: 2018-02-03 | End: 2018-02-08 | Stop reason: HOSPADM

## 2018-02-03 RX ORDER — SODIUM CHLORIDE 0.9 % (FLUSH) 0.9 %
10 SYRINGE (ML) INJECTION EVERY 8 HOURS
Status: DISCONTINUED | OUTPATIENT
Start: 2018-02-03 | End: 2018-02-08 | Stop reason: HOSPADM

## 2018-02-03 RX ORDER — LORAZEPAM 2 MG/ML
1 INJECTION INTRAMUSCULAR
Status: DISCONTINUED | OUTPATIENT
Start: 2018-02-03 | End: 2018-02-08 | Stop reason: HOSPADM

## 2018-02-03 RX ORDER — METRONIDAZOLE 500 MG/100ML
500 INJECTION, SOLUTION INTRAVENOUS EVERY 8 HOURS
Status: DISCONTINUED | OUTPATIENT
Start: 2018-02-03 | End: 2018-02-03

## 2018-02-03 RX ORDER — HYDROMORPHONE HYDROCHLORIDE 2 MG/ML
1 INJECTION, SOLUTION INTRAMUSCULAR; INTRAVENOUS; SUBCUTANEOUS
Status: DISCONTINUED | OUTPATIENT
Start: 2018-02-03 | End: 2018-02-03

## 2018-02-03 RX ORDER — SODIUM CHLORIDE 9 MG/ML
100 INJECTION, SOLUTION INTRAVENOUS CONTINUOUS
Status: DISCONTINUED | OUTPATIENT
Start: 2018-02-03 | End: 2018-02-05

## 2018-02-03 RX ORDER — SODIUM CHLORIDE 9 MG/ML
250 INJECTION, SOLUTION INTRAVENOUS AS NEEDED
Status: DISCONTINUED | OUTPATIENT
Start: 2018-02-03 | End: 2018-02-08 | Stop reason: HOSPADM

## 2018-02-03 RX ORDER — VANCOMYCIN HYDROCHLORIDE
1250
Status: COMPLETED | OUTPATIENT
Start: 2018-02-03 | End: 2018-02-03

## 2018-02-03 RX ORDER — ONDANSETRON 2 MG/ML
4 INJECTION INTRAMUSCULAR; INTRAVENOUS
Status: DISCONTINUED | OUTPATIENT
Start: 2018-02-03 | End: 2018-02-08 | Stop reason: HOSPADM

## 2018-02-03 RX ORDER — ACETAMINOPHEN 325 MG/1
650 TABLET ORAL
Status: DISCONTINUED | OUTPATIENT
Start: 2018-02-03 | End: 2018-02-08 | Stop reason: HOSPADM

## 2018-02-03 RX ORDER — METRONIDAZOLE 500 MG/100ML
500 INJECTION, SOLUTION INTRAVENOUS EVERY 12 HOURS
Status: DISCONTINUED | OUTPATIENT
Start: 2018-02-03 | End: 2018-02-05

## 2018-02-03 RX ORDER — HEPARIN SODIUM 5000 [USP'U]/ML
5000 INJECTION, SOLUTION INTRAVENOUS; SUBCUTANEOUS EVERY 8 HOURS
Status: DISCONTINUED | OUTPATIENT
Start: 2018-02-03 | End: 2018-02-03 | Stop reason: ALTCHOICE

## 2018-02-03 RX ORDER — OXYCODONE HYDROCHLORIDE 5 MG/1
30 TABLET ORAL
Status: DISCONTINUED | OUTPATIENT
Start: 2018-02-03 | End: 2018-02-03

## 2018-02-03 RX ORDER — AMIODARONE HYDROCHLORIDE 200 MG/1
400 TABLET ORAL 2 TIMES DAILY
Status: DISCONTINUED | OUTPATIENT
Start: 2018-02-03 | End: 2018-02-08 | Stop reason: HOSPADM

## 2018-02-03 RX ORDER — MORPHINE SULFATE 100 MG/1
100 TABLET, FILM COATED, EXTENDED RELEASE ORAL EVERY 12 HOURS
Status: DISCONTINUED | OUTPATIENT
Start: 2018-02-03 | End: 2018-02-03

## 2018-02-03 RX ORDER — SODIUM CHLORIDE 0.9 % (FLUSH) 0.9 %
10 SYRINGE (ML) INJECTION AS NEEDED
Status: DISCONTINUED | OUTPATIENT
Start: 2018-02-03 | End: 2018-02-08 | Stop reason: HOSPADM

## 2018-02-03 RX ORDER — FOLIC ACID 1 MG/1
1 TABLET ORAL DAILY
COMMUNITY

## 2018-02-03 RX ORDER — LEVETIRACETAM 250 MG/1
250 TABLET ORAL 2 TIMES DAILY
Status: DISCONTINUED | OUTPATIENT
Start: 2018-02-03 | End: 2018-02-08 | Stop reason: HOSPADM

## 2018-02-03 RX ORDER — SODIUM CHLORIDE 0.9 % (FLUSH) 0.9 %
5-10 SYRINGE (ML) INJECTION EVERY 8 HOURS
Status: DISCONTINUED | OUTPATIENT
Start: 2018-02-03 | End: 2018-02-08 | Stop reason: HOSPADM

## 2018-02-03 RX ADMIN — SODIUM CHLORIDE 1000 ML: 900 INJECTION, SOLUTION INTRAVENOUS at 01:03

## 2018-02-03 RX ADMIN — METRONIDAZOLE 500 MG: 500 INJECTION, SOLUTION INTRAVENOUS at 23:53

## 2018-02-03 RX ADMIN — METRONIDAZOLE 500 MG: 500 INJECTION, SOLUTION INTRAVENOUS at 04:42

## 2018-02-03 RX ADMIN — NOREPINEPHRINE BITARTRATE 2 MCG/MIN: 1 INJECTION INTRAVENOUS at 18:02

## 2018-02-03 RX ADMIN — NOREPINEPHRINE BITARTRATE 4 MCG/MIN: 1 INJECTION INTRAVENOUS at 08:26

## 2018-02-03 RX ADMIN — Medication 10 ML: at 21:34

## 2018-02-03 RX ADMIN — CEFEPIME 2 G: 2 INJECTION, POWDER, FOR SOLUTION INTRAMUSCULAR; INTRAVENOUS at 15:53

## 2018-02-03 RX ADMIN — CEFEPIME 2 G: 2 INJECTION, POWDER, FOR SOLUTION INTRAMUSCULAR; INTRAVENOUS at 05:41

## 2018-02-03 RX ADMIN — AMIODARONE HYDROCHLORIDE 1 MG/MIN: 50 INJECTION, SOLUTION INTRAVENOUS at 05:50

## 2018-02-03 RX ADMIN — NOREPINEPHRINE BITARTRATE 3 MCG/MIN: 1 INJECTION INTRAVENOUS at 14:32

## 2018-02-03 RX ADMIN — HEPARIN SODIUM 5000 UNITS: 5000 INJECTION, SOLUTION INTRAVENOUS; SUBCUTANEOUS at 05:05

## 2018-02-03 RX ADMIN — Medication 10 ML: at 11:58

## 2018-02-03 RX ADMIN — Medication 10 ML: at 13:06

## 2018-02-03 RX ADMIN — MORPHINE SULFATE 100 MG: 100 TABLET, EXTENDED RELEASE ORAL at 15:49

## 2018-02-03 RX ADMIN — SODIUM CHLORIDE 100 ML/HR: 900 INJECTION, SOLUTION INTRAVENOUS at 15:49

## 2018-02-03 RX ADMIN — SODIUM CHLORIDE 125 ML/HR: 900 INJECTION, SOLUTION INTRAVENOUS at 02:50

## 2018-02-03 RX ADMIN — HYDROMORPHONE HYDROCHLORIDE 1 MG: 2 INJECTION INTRAMUSCULAR; INTRAVENOUS; SUBCUTANEOUS at 05:09

## 2018-02-03 RX ADMIN — LEVETIRACETAM 250 MG: 250 TABLET, FILM COATED ORAL at 17:13

## 2018-02-03 RX ADMIN — ACETAMINOPHEN 650 MG: 325 TABLET ORAL at 23:57

## 2018-02-03 RX ADMIN — Medication 10 ML: at 05:00

## 2018-02-03 RX ADMIN — LEVETIRACETAM 250 MG: 250 TABLET, FILM COATED ORAL at 14:31

## 2018-02-03 RX ADMIN — NOREPINEPHRINE BITARTRATE 5 MCG/MIN: 1 INJECTION INTRAVENOUS at 09:53

## 2018-02-03 RX ADMIN — VANCOMYCIN HYDROCHLORIDE 1250 MG: 10 INJECTION, POWDER, LYOPHILIZED, FOR SOLUTION INTRAVENOUS at 01:49

## 2018-02-03 RX ADMIN — SODIUM CHLORIDE 500 ML: 900 INJECTION, SOLUTION INTRAVENOUS at 07:58

## 2018-02-03 RX ADMIN — METRONIDAZOLE 500 MG: 500 INJECTION, SOLUTION INTRAVENOUS at 09:30

## 2018-02-03 RX ADMIN — HEPARIN SODIUM 5000 UNITS: 5000 INJECTION, SOLUTION INTRAVENOUS; SUBCUTANEOUS at 14:30

## 2018-02-03 RX ADMIN — NOREPINEPHRINE BITARTRATE 4 MCG/MIN: 1 INJECTION INTRAVENOUS at 13:05

## 2018-02-03 RX ADMIN — FOLIC ACID 1 MG: 1 TABLET ORAL at 04:45

## 2018-02-03 NOTE — H&P
Hospitalist Admission Note    NAME: Yadi Real   :  1975   MRN:  [id number]     Date/Time:  2/3/2018 2:19 AM    Patient PCP: None  ________________________________________________________________________    My assessment of this patient's clinical condition and my plan of care is as follows. Assessment / Plan:  A.fib with RVR  Admit to PCU  Started on amiodarone ggt in ED  Does have history of a.fib  Not on anticoagulation due to history of ICH  Will ask cardiology consult  Seems that she used to be on amiodarone in the past per chart review, will ask pharmacy for med rec    Sepsis with leukocytosis, tachycardia and hypotension POA  Suspect due to left leg infection (have chronic wound)  IV vancomycin, cefepime and Flagyl for now  Will order blood and wound cultures  Will also CXR and UA as not been ordered in ED  IVF    Seizures (main reason she was brought to ED)   Does have h/o seizures  Apparently not on seizure meds at home  H/o ICH and current CT with old CVA  PRN Ativan for now  Seizure precausions  Neurology consult    YEN  ? Baseline Cr  Does have urinary retention of 350ml  IVF  Martinez's for now  If doesn't get better then nephrology consult can be considered  H/p Multiple Myeloma per ED report  Check UA    Sickle cell crisis & Sickle cell anemia   Retic Count is 8.1  IVF  Start Folic acid  Keep O2 > 78%  Monitor retic count  Will ask hematology consult  Continue Long acting morphine  PRN IV dilaudid  PRBC ordered in ED, awaiting transfusion. Has lots of antibodies as per blood bank report    Abnormal LFTs   Suspect due to sepsis and sickling  IVF and monitor, if doesn't get better then US abdomen can be considered    Multiple myeloma   Follows at [facility] and not on therapy per ED report    Code Status: Full     DVT Prophylaxis: Lovenox        Subjective:   CHIEF COMPLAINT: Seizures    HISTORY OF PRESENT ILLNESS:     Yadi Real is a 36 y.o.    female who presents with seizure to ED. I am not able to reach the family to discuss the details of seizures. Pt is currently sleepy under influence of ativan which she received in ED but able to provide some info. She doesn't remember what happened. She does report prior history of seizure, a.fib. Pt reported chronic left leg wound for past year. Pt does report pain all over the body. denies any fever, chills, nausea, vomiting, diarrhea, cough, shortness of breath. In ED pt noted to be in a.fib with RVR, hypotensive, leukocytosis. We were asked to admit for work up and evaluation of the above problems. Past Medical History:   Diagnosis Date    A-fib [facility])     Arthritis     Chronic ulcer of left leg ([address]     H/O tracheostomy     decannulated    Heart murmur     Hemorrhagic stroke ([address] 1/2015    Ill-defined condition     sickle cell    Intracranial hemorrhage ([address] 1/2015    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    PEG (percutaneous endoscopic gastrostomy) status ([address]     removed    Right atrial thrombus     Right sided weakness     from hemorrhagic stroke    Sickle cell disease ([address]         Past Surgical History:   Procedure Laterality Date    HX CHOLECYSTECTOMY      HX OTHER SURGICAL  1/2015    PEG    HX OTHER SURGICAL      brain hemorrhage evacuation    HX TONSILLECTOMY      HX TRACHEOSTOMY  1/2015       Social History   Substance Use Topics    Smoking status: Never Smoker    Smokeless tobacco: Never Used    Alcohol use No        Family History   Problem Relation Age of Onset    Hypertension Other      Allergies   Allergen Reactions    Pcn [Penicillins] Hives    Zantac [Ranitidine Hcl] Itching        Prior to Admission medications    Medication Sig Start Date End Date Taking? Authorizing Provider   morphine CR (MS CONTIN) 100 mg CR tablet Take 100 mg by mouth every twelve (12) hours.    Yes Phys Other, MD   oxyCODONE IR (ROXICODONE) 30 mg immediate release tablet Take 30 mg by mouth every four (4) hours as needed for Pain. Yes Phys Other, MD       REVIEW OF SYSTEMS:     I am not able to complete the review of systems because: The patient is intubated and sedated    The patient has altered mental status due to his acute medical problems    The patient has baseline aphasia from prior stroke(s)    The patient has baseline dementia and is not reliable historian    The patient is in acute medical distress and unable to provide information           Total of 12 systems reviewed as follows:       POSITIVE= underlined text  Negative = text not underlined  General:  fever, chills, sweats, generalized weakness, weight loss/gain,      loss of appetite   Eyes:    blurred vision, eye pain, loss of vision, double vision  ENT:    rhinorrhea, pharyngitis   Respiratory:   cough, sputum production, SOB, DELGADO, wheezing, pleuritic pain   Cardiology:   chest pain, palpitations, orthopnea, PND, edema, syncope   Gastrointestinal:  abdominal pain , N/V, diarrhea, dysphagia, constipation, bleeding   Genitourinary:  frequency, urgency, dysuria, hematuria, incontinence   Muskuloskeletal :  arthralgia, myalgia, back pain  Hematology:  easy bruising, nose or gum bleeding, lymphadenopathy   Dermatological: Leg wound, pruritis, color change / jaundice  Endocrine:   hot flashes or polydipsia   Neurological:  headache, dizziness, confusion, focal weakness, paresthesia,     Speech difficulties, memory loss, gait difficulty, seizures  Psychological: Feelings of anxiety, depression, agitation    Objective:   VITALS:    Visit Vitals    /74    Pulse 75    Resp 16    Ht 5' 4\" (1.626 m)    Wt 45.4 kg (100 lb)    SpO2 100%    BMI 17.16 kg/m2       PHYSICAL EXAM:    General:    Somnolent but cooperative, no distress, appears stated age.      HEENT: Atraumatic, anicteric sclerae, pink conjunctivae     No oral ulcers, mucosa moist, throat clear, dentition fair  Neck:  Supple, symmetrical,  thyroid: non tender  Lungs:   Clear to auscultation bilaterally. No Wheezing or Rhonchi. No rales. Chest wall:  No tenderness  No Accessory muscle use. Heart:   Regular  rhythm,  No  murmur   No edema  Abdomen:   Soft, non-tender. Not distended. Bowel sounds normal  Extremities: No cyanosis. No clubbing,      Skin turgor normal, Capillary refill normal, Radial dial pulse 2+  Skin:     Left leg ulcer, wound, Not pale. Not Jaundiced   Psych:  Fair insight. Not depressed. Not anxious or agitated. Neurologic: EOMs intact. No facial asymmetry. No aphasia or slurred speech. Symmetrical strength, Sensation grossly intact. Alert and oriented X 4.     _______________________________________________________________________  Care Plan discussed with:    Comments   Patient y    Family      RN y    Care Manager                    Consultant:  rosy ED physician   _______________________________________________________________________  Expected  Disposition:   Home with Family y   HH/PT/OT/RN    SNF/LTC    ANT    ________________________________________________________________________  TOTAL TIME: 79 Minutes    Critical Care Provided     Minutes non procedure based      Comments    y Reviewed previous records   >50% of visit spent in counseling and coordination of care y Discussion with patient and questions answered       ________________________________________________________________________  Signed: Areli Santo MD    Procedures: see electronic medical records for all procedures/Xrays and details which were not copied into this note but were reviewed prior to creation of Plan.     LAB DATA REVIEWED:    Recent Results (from the past 24 hour(s))   EKG, 12 LEAD, INITIAL    Collection Time: 02/02/18  9:49 PM   Result Value Ref Range    Ventricular Rate 144 BPM    Atrial Rate 136 BPM    QRS Duration 92 ms    Q-T Interval 316 ms    QTC Calculation (Bezet) 489 ms    Calculated R Axis 51 degrees    Calculated T Axis -10 degrees    Diagnosis       Atrial fibrillation with rapid ventricular response  Moderate voltage criteria for LVH, may be normal variant  Abnormal QRS-T angle, consider primary T wave abnormality  Abnormal ECG  When compared with ECG of 21-APR-2015 13:57,  Atrial fibrillation has replaced Sinus rhythm  Vent. rate has increased BY  54 BPM  ST now depressed in Anterior leads  Nonspecific T wave abnormality now evident in Inferior leads  Nonspecific T wave abnormality no longer evident in Anterior leads     CBC WITH AUTOMATED DIFF    Collection Time: 02/02/18 10:05 PM   Result Value Ref Range    WBC 20.5 (H) 3.6 - 11.0 K/uL    RBC 2.39 (L) 3.80 - 5.20 M/uL    HGB 6.0 (L) 11.5 - 16.0 g/dL    HCT 17.8 (LL) 35.0 - 47.0 %    MCV 74.5 (L) 80.0 - 99.0 FL    MCH 25.1 (L) 26.0 - 34.0 PG    MCHC 33.7 30.0 - 36.5 g/dL    RDW 20.6 (H) 11.5 - 14.5 %    PLATELET 977 (H) 938 - 400 K/uL    MPV 10.8 8.9 - 12.9 FL    NRBC 0.3 (H) 0  WBC    ABSOLUTE NRBC 0.06 (H) 0.00 - 0.01 K/uL    NEUTROPHILS 78 (H) 32 - 75 %    LYMPHOCYTES 13 12 - 49 %    MONOCYTES 6 5 - 13 %    EOSINOPHILS 0 0 - 7 %    BASOPHILS 0 0 - 1 %    IMMATURE GRANULOCYTES 3 (H) 0.0 - 0.5 %    ABS. NEUTROPHILS 16.0 (H) 1.8 - 8.0 K/UL    ABS. LYMPHOCYTES 2.7 0.8 - 3.5 K/UL    ABS. MONOCYTES 1.2 (H) 0.0 - 1.0 K/UL    ABS. EOSINOPHILS 0.0 0.0 - 0.4 K/UL    ABS. BASOPHILS 0.0 0.0 - 0.1 K/UL    ABS. IMM.  GRANS. 0.6 (H) 0.00 - 0.04 K/UL    DF SMEAR SCANNED      RBC COMMENTS TARGET CELLS  3+        RBC COMMENTS WOODROW CELLS  3+        RBC COMMENTS ANISOCYTOSIS  2+        RBC COMMENTS SICKLE CELLS  2+       METABOLIC PANEL, COMPREHENSIVE    Collection Time: 02/02/18 10:05 PM   Result Value Ref Range    Sodium 137 136 - 145 mmol/L    Potassium 3.6 3.5 - 5.1 mmol/L    Chloride 103 97 - 108 mmol/L    CO2 21 21 - 32 mmol/L    Anion gap 13 5 - 15 mmol/L    Glucose 117 (H) 65 - 100 mg/dL    BUN 40 (H) 6 - 20 MG/DL    Creatinine 2.04 (H) 0.55 - 1.02 MG/DL    BUN/Creatinine ratio 20 12 - 20      GFR est AA 32 (L) >60 ml/min/1.73m2 GFR est non-AA 27 (L) >60 ml/min/1.73m2    Calcium 8.2 (L) 8.5 - 10.1 MG/DL    Bilirubin, total 2.9 (H) 0.2 - 1.0 MG/DL    ALT (SGPT) 43 12 - 78 U/L    AST (SGOT) 158 (H) 15 - 37 U/L    Alk.  phosphatase 163 (H) 45 - 117 U/L    Protein, total 8.0 6.4 - 8.2 g/dL    Albumin 2.8 (L) 3.5 - 5.0 g/dL    Globulin 5.2 (H) 2.0 - 4.0 g/dL    A-G Ratio 0.5 (L) 1.1 - 2.2     RETICULOCYTE COUNT    Collection Time: 02/02/18 10:05 PM   Result Value Ref Range    Reticulocyte count 8.1 (H) 0.7 - 2.1 %    Absolute Retic Cnt. 0.1917 (H) 0.0164 - 0.0776 M/ul   LD    Collection Time: 02/02/18 10:05 PM   Result Value Ref Range     (H) 81 - 246 U/L   TYPE + CROSSMATCH    Collection Time: 02/02/18 11:02 PM   Result Value Ref Range    Crossmatch Expiration 02/05/2018     ABO/Rh(D) B POSITIVE     Antibody screen POS     Comment       Previously identified Anti E, Anti M at [facility] and additonal Anti Cw, Warm Autoantibodies, and a warm unidentified antibody at [facility]     Antibody ID PENDING     GARRETT Poly POS     GARRETT IgG POS     GARRETT C3b/C3d PENDING

## 2018-02-03 NOTE — PROGRESS NOTES
Pharmacy Automatic Renal Dosing Protocol - Antimicrobials    Indication for Antimicrobials: skin and soft tissue infection     Current Regimen of Each Antimicrobial:  Vancomycin pharmacy to dose (Start Date 2/3; Day # 1)    Significant Cultures:   NA    Paralysis, amputations, malnutrition: possible malnutrition (low body weight and low albumin)    Labs:  Recent Labs      02/02/18   2205   CREA  2.04*   BUN  40*   WBC  20.5*     No data recorded. Creatinine Clearance (mL/min) or Dialysis: 26  No results found for: PCT    Impression/Plan:   Vancomycin 1250 mg IV once followed by 750 mg IV daily for an expected trough of 18.07     Pharmacy will follow daily and adjust medications as appropriate for renal function and/or serum levels. Thank you,  Yanely Braxton, PHARMD          Recommended duration of therapy  [email or web address]/[email or web address]. docx    Renal Dosing  [email or web address]/Pharmacy/[email or web address]/Renal%[email or web address]. pdf

## 2018-02-03 NOTE — CONSULTS
[address] [facility] at [facility]  [phone]    Hematology / Oncology Consult    Reason for Visit:   Nhi Cabrera is a 36 y.o. female who is seen in consultation at the request of Dr. Adrianna Torrez for evaluation of sickle cell anemia. History of Present Illness:   Ms. Jacinta Jaimes is a 35 y/o female with sickle cell anemia, lymphoma of the stomach and A-fib who was admitted after a seizure. She was treated with Ativan. She was also found to be in Afib with RVR. The patient tells me that she suddenly felt her legs go weak and then ended up in the ER. She states she was having some low grade fevers at home for the past 3 days as well as n/v and diarrhea alternating with constipation. She states she is followed by Dr. Chad Morales at [facility] for a cancer. Although patient is unsure of the type, her problem list here include lymphoma of the stomach. The patient states she has never received treatment for this, was in remission for a while, and then started having progression of lymph nodes which has led to swelling of her R thigh. She states she saw Dr. Mandy Cervantes and her NP at [facility] a few weeks ago with plan to set up an outpatient biopsy of her LNs. For her sickle cell disease, she states she is followed by another physician. She is usually only hospitalized 1-2 times a year for acute sickle cell crisis, and she denies any current acute sickle cell pain. Upon admission here, her Hgb has ranged between 5.2 and 6.0 with retic 8.1 and bili 2.2. She reportedly has several antibodies. She is currently receiving 1 Unit of PRBCs.         Past Medical History:   Diagnosis Date    A-fib [facility])     Arthritis     Chronic ulcer of left leg ([address]     H/O tracheostomy     decannulated    Heart murmur     Hemorrhagic stroke ([address] 1/2015    Ill-defined condition     sickle cell    Intracranial hemorrhage ([address] 1/2015    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 1/2015    PEG (percutaneous endoscopic gastrostomy) status [address]     removed    Right atrial thrombus     Right sided weakness     from hemorrhagic stroke    Sickle cell disease ([facility]       Past Surgical History:   Procedure Laterality Date    HX CHOLECYSTECTOMY      HX OTHER SURGICAL  1/2015    PEG    HX OTHER SURGICAL      brain hemorrhage evacuation    HX TONSILLECTOMY      HX TRACHEOSTOMY  1/2015      Social History   Substance Use Topics    Smoking status: Never Smoker    Smokeless tobacco: Never Used    Alcohol use No      Family History   Problem Relation Age of Onset    Hypertension Other      Current Facility-Administered Medications   Medication Dose Route Frequency    [START ON 2/4/2018] vancomycin (VANCOCIN) 750 mg in 0.9% sodium chloride (MBP/ADV) 250 mL  750 mg IntraVENous Q24H    0.9% sodium chloride infusion  125 mL/hr IntraVENous CONTINUOUS    cefepime (MAXIPIME) 2 g in 0.9 %  mL IVPB  2 g IntraVENous Q12H    levalbuterol (XOPENEX) nebulizer soln 1.25 mg/3 mL  1.25 mg Nebulization Q6H PRN    HYDROmorphone (DILAUDID) injection 1 mg  1 mg IntraVENous Q3H PRN    sodium chloride (NS) flush 5-10 mL  5-10 mL IntraVENous Q8H    sodium chloride (NS) flush 5-10 mL  5-10 mL IntraVENous PRN    acetaminophen (TYLENOL) tablet 650 mg  650 mg Oral Q6H PRN    ondansetron (ZOFRAN) injection 4 mg  4 mg IntraVENous Q4H PRN    heparin (porcine) injection 5,000 Units  5,000 Units SubCUTAneous Q8H    LORazepam (ATIVAN) injection 1 mg  1 mg IntraVENous E9X PRN    folic acid (FOLVITE) tablet 1 mg  1 mg Oral DAILY    0.9% sodium chloride infusion 250 mL  250 mL IntraVENous PRN    NOREPINephrine (LEVOPHED) 8 mg in dextrose 5% 250 mL infusion  2-16 mcg/min IntraVENous TITRATE    saline peripheral flush soln 10 mL  10 mL InterCATHeter PRN    SALINE PERIPHERAL FLUSH Q8H soln 10 mL  10 mL InterCATHeter Q8H    metroNIDAZOLE (FLAGYL) IVPB premix 500 mg  500 mg IntraVENous Q12H    amiodarone (CORDARONE) 375 mg/250 mL D5W infusion  1 mg/min IntraVENous CONTINUOUS      Allergies   Allergen Reactions    Pcn [Penicillins] Hives    Silvadene [Silver Sulfadiazine] Hives    Zantac [Ranitidine Hcl] Itching        Review of Systems: A complete review of systems was obtained, negative except as described above. Physical Exam:     Visit Vitals    BP 91/44 (BP 1 Location: Left arm)    Pulse (!) 54    Temp 97.4 °F (36.3 °C)    Resp 18    Ht 5' 4\" (1.626 m)    Wt 100 lb (45.4 kg)    SpO2 99%    BMI 17.16 kg/m2     ECOG PS: 2  General: No distress  Eyes: PERRLA, anicteric sclerae  HENT: Atraumatic with normal appearance of ears and nose; OP clear  Neck: Supple; no thyromegaly   Lymphatic: No cervical, supraclavicular adenopathy  Respiratory: CTAB, normal respiratory effort  CV: Normal rate, regular rhythm, no murmurs. R thigh with 2+ pitting edema  GI: Soft, nontender, nondistended, no masses, no hepatomegaly, no splenomegaly  : Martinez in place draining yellow urine  MS: Normal gait and station. Digits without clubbing or cyanosis. Skin: No rashes, ecchymoses, or petechiae. Normal temperature, turgor, and texture. Neuro/Psych: Alert, oriented, appropriate affect, poor insight to her medical diagnoses. Results:     Lab Results   Component Value Date/Time    WBC 15.9 02/03/2018 04:52 AM    HGB 5.2 02/03/2018 04:52 AM    HCT 15.2 02/03/2018 04:52 AM    PLATELET 614 72/08/7005 04:52 AM    MCV 74.9 02/03/2018 04:52 AM    ABS.  NEUTROPHILS 14.3 02/03/2018 04:52 AM     Lab Results   Component Value Date/Time    Sodium 139 02/03/2018 04:52 AM    Potassium 3.8 02/03/2018 04:52 AM    Chloride 108 02/03/2018 04:52 AM    CO2 22 02/03/2018 04:52 AM    Glucose 97 02/03/2018 04:52 AM    BUN 38 02/03/2018 04:52 AM    Creatinine 1.47 02/03/2018 04:52 AM    GFR est AA 47 02/03/2018 04:52 AM    GFR est non-AA 39 02/03/2018 04:52 AM    Calcium 7.3 02/03/2018 04:52 AM     Lab Results   Component Value Date/Time    Bilirubin, total 2.2 02/03/2018 04:52 AM ALT (SGPT) 41 02/03/2018 04:52 AM    AST (SGOT) 130 02/03/2018 04:52 AM    Alk. phosphatase 149 02/03/2018 04:52 AM    Protein, total 6.5 02/03/2018 04:52 AM    Albumin 2.1 02/03/2018 04:52 AM    Globulin 4.4 02/03/2018 04:52 AM       Assessment and Recommendations:   Kael Kenny is a 36 y.o. female admitted after seizure with AF with RVR, being treated for possible sepsis. 1. Sickle cell anemia: Hgb of 5.2 is likely partially due to hemolysis, but may also be lower due to sepsis and possible due to lymphoma. It is unclear what her baseline Hgb is and the usual goal Hgb during hospitalization is in the 6.5 -7 range for patients with sickle cell anemia. As she has several antibodies, finding suitable units may take time and may result in suboptimal response to each unit transfused. -- Agree with transfusion of 1 Unit and recheck CBC.  -- Transfuse to goal Hgb >= 6.5 -7 range. -- Continue home opioid medications as tolerated  -- Check daily CBC, CMP and every other day LDH, retic  -- Continue IVF hydration - normal saline is preferred while patient is hypotensive. Otherwise, D5 1/2 NS is usually better tolerated in patients with sickle cell anemia. 2. Lymphoma of stomach: Being managed by [facility]. Priya Almanza at [facility] and patient was recently seen a few weeks ago. I am unable to access [facility] records at this time and recommend that patient follow up with Dr. Lennie Weathers after discharge from hospital.    3. AF with RVR: Now back in NSR and Cardiology recommends PO Amiodarone. 4. Leukocytosis: Unclear etiology, mostly neutrophils and may be related to infection. Blood cultures negative thus far. -- On Vanc, Cefepime, Flagyl    5. Seizure disorder: Reportedly has h/o seizures, but not on any seizure medications at home. -- Neurology consulted.       Signed By: Hilda Aranda MD     February 3, 2018

## 2018-02-03 NOTE — ED PROVIDER NOTES
EMERGENCY DEPARTMENT HISTORY AND PHYSICAL EXAM      Date: 2/2/2018  Patient Name: Chaitanya Berry    History of Presenting Illness     Chief Complaint   Patient presents with    Seizure     EMS reports family noting several seconds of seizure activity. History Provided By: EMS    HPI: Chaitanya Berry, 36 y.o. female with PMHx significant for Sickle cell disease, Arthritis, A-fib, hemorrhagic stroke, presents via EMS to the ED for evaluation during an active seizure. Per EMS they were called by  while the pt was seizing. They state that seizure lasted a few seconds before resolving. After the seizure the pt was post ictal with right sided deficits and slurred speech per EMS. On route the pt became alert and responsive and her deficit resolved. Of note pt also had episodes of A-fib on route per EMS. No documented hx of seizures but EMS states the pt does have a hx. There were no falls, trauma to the tongue. Social Hx: Hx unknown pt is actively seizing, per chart she is negative on tobacco use, alcohol use, and drug use. PCP: None    Hx is otherwise limited as the pt is having an active seizure while in room. Current Facility-Administered Medications   Medication Dose Route Frequency Provider Last Rate Last Dose    amiodarone (CORDARONE) 375 mg/250 mL D5W infusion  1 mg/min IntraVENous CONTINUOUS Patrica Ambrocio MD 40 mL/hr at 02/02/18 2346 1 mg/min at 02/02/18 2346    0.9% sodium chloride infusion 250 mL  250 mL IntraVENous PRN Patrica Ambrocio MD        sodium chloride 0.9 % bolus infusion 1,000 mL  1,000 mL IntraVENous ONCE Patrica Ambrocio MD        Please update ht in CC for dose verification  1 Each Other ONCE Patrica Ambrocio MD         Current Outpatient Prescriptions   Medication Sig Dispense Refill    morphine CR (MS CONTIN) 100 mg CR tablet Take 100 mg by mouth every twelve (12) hours.       oxyCODONE IR (ROXICODONE) 30 mg immediate release tablet Take 30 mg by mouth every four (4) hours as needed for Pain.  mupirocin (BACTROBAN) 2 % ointment Apply  to affected area daily. 22 g 0    trimethoprim-sulfamethoxazole (BACTRIM)  mg per tablet Take 1 Tab by mouth two (2) times a day. 10 Tab 0    amiodarone (CORDARONE) 200 mg tablet Take 200 mg by mouth.  mupirocin calcium (BACTROBAN) 2 % topical cream Apply  to affected area two (2) times a day.  docusate sodium (COLACE) 100 mg capsule Take 100 mg by mouth nightly.  guaiFENesin (ROBITUSSIN) 100 mg/5 mL liquid Take 200 mg by mouth four (4) times daily as needed for Cough.  HEPARIN SODIUM,PORCINE (HEPARIN, PORCINE,) 5,000 unit/mL injection 5,000 Units every eight (8) hours.  polyethylene glycol (MIRALAX) 17 gram packet Take 17 g by mouth daily.  esomeprazole (NEXIUM) 20 mg capsule Take 20 mg by mouth daily.  senna (SENNA) 8.6 mg tablet Take 2 Tabs by mouth nightly.  ondansetron hcl (ZOFRAN, AS HYDROCHLORIDE,) 4 mg tablet Take 4 mg by mouth every eight (8) hours as needed for Nausea.  albuterol (ACCUNEB) 0.63 mg/3 mL nebulizer solution 0.63 mg by Nebulization route every six (6) hours as needed for Wheezing.          Past History     Past Medical History:  Past Medical History:   Diagnosis Date    [facility]     Arthritis     Chronic ulcer of left leg ([facility]     H/O tracheostomy     decannulated    Heart murmur     Hemorrhagic stroke ([facility] 1/2015    Ill-defined condition     sickle cell    Intracranial hemorrhage ([facility]) 1/2015    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[facility] 1/2015    PEG (percutaneous endoscopic gastrostomy) status ([facility]     removed    Right atrial thrombus     Right sided weakness     from hemorrhagic stroke    Sickle cell disease ([facility]        Past Surgical History:  Past Surgical History:   Procedure Laterality Date    HX CHOLECYSTECTOMY      HX OTHER SURGICAL  1/2015    PEG    HX OTHER SURGICAL      brain hemorrhage evacuation    HX TONSILLECTOMY      HX TRACHEOSTOMY  1/2015       Family History:  Family History   Problem Relation Age of Onset    Hypertension Other        Social History:  Social History   Substance Use Topics    Smoking status: Never Smoker    Smokeless tobacco: Never Used    Alcohol use No       Allergies: Allergies   Allergen Reactions    Pcn [Penicillins] Hives    Zantac [Ranitidine Hcl] Itching         Review of Systems   Review of Systems   Unable to perform ROS: Acuity of condition (seizure)       Physical Exam   Physical Exam   Constitutional: She appears well-developed and well-nourished. Thin/emaciated female actively seizing with left lateral eye deviation   HENT:   Head: Normocephalic and atraumatic. Mouth/Throat: Oropharynx is clear and moist.   No obvious trauma   Eyes: Right eye exhibits no discharge. Left eye exhibits no discharge. Scleral icterus is present. Left eye exhibits nystagmus (left gaze). Right pupil is reactive. Left pupil is reactive. Pupils are equal.   Neck: Normal range of motion. Neck supple. No JVD present. Cardiovascular: Normal rate and regular rhythm. Exam reveals no gallop and no friction rub. Murmur heard. Pulmonary/Chest: Effort normal and breath sounds normal. No respiratory distress. She has no wheezes. She has no rales. Pt has a port on her right chest.   Abdominal: Soft. She exhibits distension (upper abdomen). Bowel sounds are increased. There is no tenderness. Musculoskeletal: She exhibits edema (Rle with 1-2+ edema). She exhibits no deformity. Neurological: She is unresponsive. No cranial nerve deficit. She exhibits normal muscle tone. Skin: Skin is warm and dry. No ecchymosis, no laceration, no petechiae and no rash noted. She is not diaphoretic. Bandage in place on the left leg with diffuse edema of the leg; medial leg with 6 x 4 cm wound and lateral portion of the LE with a 1 x 2 cm wound.  Both with mildly malodorous discharge   Nursing note and vitals reviewed. Diagnostic Study Results     Labs -     Recent Results (from the past 12 hour(s))   EKG, 12 LEAD, INITIAL    Collection Time: 02/02/18  9:49 PM   Result Value Ref Range    Ventricular Rate 144 BPM    Atrial Rate 136 BPM    QRS Duration 92 ms    Q-T Interval 316 ms    QTC Calculation (Bezet) 489 ms    Calculated R Axis 51 degrees    Calculated T Axis -10 degrees    Diagnosis       Atrial fibrillation with rapid ventricular response  Moderate voltage criteria for LVH, may be normal variant  Abnormal QRS-T angle, consider primary T wave abnormality  Abnormal ECG  When compared with ECG of 21-APR-2015 13:57,  Atrial fibrillation has replaced Sinus rhythm  Vent. rate has increased BY  54 BPM  ST now depressed in Anterior leads  Nonspecific T wave abnormality now evident in Inferior leads  Nonspecific T wave abnormality no longer evident in Anterior leads     CBC WITH AUTOMATED DIFF    Collection Time: 02/02/18 10:05 PM   Result Value Ref Range    WBC 20.5 (H) 3.6 - 11.0 K/uL    RBC 2.39 (L) 3.80 - 5.20 M/uL    HGB 6.0 (L) 11.5 - 16.0 g/dL    HCT 17.8 (LL) 35.0 - 47.0 %    MCV 74.5 (L) 80.0 - 99.0 FL    MCH 25.1 (L) 26.0 - 34.0 PG    MCHC 33.7 30.0 - 36.5 g/dL    RDW 20.6 (H) 11.5 - 14.5 %    PLATELET 519 (H) 283 - 400 K/uL    MPV 10.8 8.9 - 12.9 FL    NRBC 0.3 (H) 0  WBC    ABSOLUTE NRBC 0.06 (H) 0.00 - 0.01 K/uL    NEUTROPHILS 78 (H) 32 - 75 %    LYMPHOCYTES 13 12 - 49 %    MONOCYTES 6 5 - 13 %    EOSINOPHILS 0 0 - 7 %    BASOPHILS 0 0 - 1 %    IMMATURE GRANULOCYTES 3 (H) 0.0 - 0.5 %    ABS. NEUTROPHILS 16.0 (H) 1.8 - 8.0 K/UL    ABS. LYMPHOCYTES 2.7 0.8 - 3.5 K/UL    ABS. MONOCYTES 1.2 (H) 0.0 - 1.0 K/UL    ABS. EOSINOPHILS 0.0 0.0 - 0.4 K/UL    ABS. BASOPHILS 0.0 0.0 - 0.1 K/UL    ABS. IMM.  GRANS. 0.6 (H) 0.00 - 0.04 K/UL    DF SMEAR SCANNED      RBC COMMENTS TARGET CELLS  3+        RBC COMMENTS WOODROW CELLS  3+        RBC COMMENTS ANISOCYTOSIS  2+        RBC COMMENTS SICKLE CELLS  2+ METABOLIC PANEL, COMPREHENSIVE    Collection Time: 02/02/18 10:05 PM   Result Value Ref Range    Sodium 137 136 - 145 mmol/L    Potassium 3.6 3.5 - 5.1 mmol/L    Chloride 103 97 - 108 mmol/L    CO2 21 21 - 32 mmol/L    Anion gap 13 5 - 15 mmol/L    Glucose 117 (H) 65 - 100 mg/dL    BUN 40 (H) 6 - 20 MG/DL    Creatinine 2.04 (H) 0.55 - 1.02 MG/DL    BUN/Creatinine ratio 20 12 - 20      GFR est AA 32 (L) >60 ml/min/1.73m2    GFR est non-AA 27 (L) >60 ml/min/1.73m2    Calcium 8.2 (L) 8.5 - 10.1 MG/DL    Bilirubin, total 2.9 (H) 0.2 - 1.0 MG/DL    ALT (SGPT) 43 12 - 78 U/L    AST (SGOT) 158 (H) 15 - 37 U/L    Alk. phosphatase 163 (H) 45 - 117 U/L    Protein, total 8.0 6.4 - 8.2 g/dL    Albumin 2.8 (L) 3.5 - 5.0 g/dL    Globulin 5.2 (H) 2.0 - 4.0 g/dL    A-G Ratio 0.5 (L) 1.1 - 2.2     RETICULOCYTE COUNT    Collection Time: 02/02/18 10:05 PM   Result Value Ref Range    Reticulocyte count 8.1 (H) 0.7 - 2.1 %    Absolute Retic Cnt. 0.1917 (H) 0.0164 - 0.0776 M/ul   LD    Collection Time: 02/02/18 10:05 PM   Result Value Ref Range     (H) 81 - 246 U/L       Radiologic Studies -   CT Results  (Last 48 hours)               02/02/18 2309  CT HEAD WO CONT Final result    Impression:  IMPRESSION: No acute intracranial process. 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 Narrative:  EXAM:  CT HEAD WO CONT       INDICATION:   Stroke; seizure, focal defect       COMPARISON: None. CONTRAST:  None. TECHNIQUE: Unenhanced CT of the head was performed using 5 mm images. Brain and   bone windows were generated. CT dose reduction was achieved through use of a   standardized protocol tailored for this examination and automatic exposure   control for dose modulation. FINDINGS:   The ventricles and sulci are normal in size, shape and configuration and   midline. There is periventricular white matter disease. Encephalomalacia seen in   the left posterior parietal/occipital lobe.  There is no intracranial hemorrhage,   extra-axial collection, mass, mass effect or midline shift. The basilar   cisterns are open. No acute infarct is identified. The bone windows demonstrate   no abnormalities. The visualized portions of the paranasal sinuses and mastoid   air cells are clear. Medical Decision Making   I am the first provider for this patient. I reviewed the vital signs, available nursing notes, past medical history, past surgical history, family history and social history. Vital Signs-Reviewed the patient's vital signs. Patient Vitals for the past 12 hrs:   Pulse Resp BP SpO2   02/02/18 2347 (!) 110 13 - 99 %   02/02/18 2345 - - 112/89 -   02/02/18 2318 - - (!) 116/92 -   02/02/18 2300 (!) 107 14 (!) 86/61 100 %   02/02/18 2230 (!) 139 15 (!) 80/59 100 %   02/02/18 2200 (!) 151 16 - 100 %     EKG interpretation: (Preliminary)  21:49  Rhythm: atrial fib with RVR; and irregular. Rate (approx.): 144; Other findings: abnormal EKG. Cary Torres MD    Records Reviewed: Nursing Notes and Old Medical Records    Provider Notes (Medical Decision Making):   Patient with two episodes of seizure, stopped with ativan. Exam concerning for cellulitis, stroke/hemorrhage, sepsis. ED Course:   Initial assessment performed. The patients presenting problems have been discussed, and they are in agreement with the care plan formulated and outlined with them. I have encouraged them to ask questions as they arise throughout their visit. PROGRESS NOTE:  9:50 PM  Nystagmus with left gaze preference resolved after IV ativan. Patient breathing easily. Opens eyes to commands but remains non-verbal. VS with continued tachycardia. EKG with Afib; unknown if new? 22:30 No further seizure activity. Patient remains sedated with ativan, pupils reactive without evidence of subclinical seizure. Await results for admission.  Tachycardia continues and unresolved with IVF; amiodarone documented in records previously. PROGRESS NOTE:  11:30 PM  Pt's brother at bedside and provides further history. Pt does have a hx of multiple myeloma and has an appointment in 3 days for transfusion given worsening anemia. Pt has been weak x 3 days with c/o leg pain and swelling but pt did not want to be seen by a doctor. CONSULT NOTE:   11:44 PM  Karen Moncada MD spoke with Dr. Haresh Barbosa,   Specialty: Hospitalist  Discussed pt's hx, disposition, and available diagnostic and imaging results. Reviewed care plans. Consultant will evaluate pt for admission. Written by Mathew Hussein ED Scribe, as dictated by Karen Moncada MD.    PROGRESS NOTE:  12:04 AM  Pt continues to be tachycardic, awaiting Amiodarone from pharmacy and admission bed. Critical Care Time:   CRITICAL CARE NOTE :  12:00 AM    IMPENDING DETERIORATION -Airway, Respiratory, Cardiovascular, CNS, Metabolic, Renal and Hepatic  ASSOCIATED RISK FACTORS - Hypotension, Shock, Hypoxia, Dysrhythmia, Metabolic changes, Dehydration, Vascular Compromise and CNS Decompensation  MANAGEMENT- Bedside Assessment and Supervision of Care  INTERPRETATION -  Xrays, ECG and Blood Pressure  INTERVENTIONS - hemodynamic mngmt, Neurologic interventions  and Metobolic interventions  CASE REVIEW - Hospitalist, Nursing and Family  TREATMENT RESPONSE -Improved and Stable  PERFORMED BY - Self    NOTES   :  I have spent 75 minutes of critical care time involved in lab review, consultations with specialist, family decision- making, bedside attention and documentation. During this entire length of time I was immediately available to the patient . Karen Moncada MD    Disposition:  ADMIT NOTE:  11:44 PM  Patient is being admitted to the hospital by Dr. Haresh Barbosa. The results of their tests and reasons for their admission have been discussed with them and/or available family. They convey agreement and understanding for the need to be admitted and for their admission diagnosis. Consultation has been made with the inpatient physician specialist for hospitalization. PLAN: Admit the pt    Diagnosis     Clinical Impression: No diagnosis found. Attestations: This note is prepared by Demetrius Houser acting as Scribe for MD Carla Dudley MD : The scribe's documentation has been prepared under my direction and personally reviewed by me in its entirety. I confirm that the note above accurately reflects all work, treatment, procedures, and medical decision making performed by me.

## 2018-02-03 NOTE — CONSULTS
NEUROLOGY NOTE     DATE OF CONSULTATION: 2/3/2018    CONSULTED BY: Malu Brody MD    Chief Complaint   Patient presents with    Seizure     EMS reports family noting several seconds of seizure activity. Reason for Consult  I have been asked to see the patient in neurological consultation to render advice and opinion regarding seizure. HISTORY OF PRESENT ILLNESS  Padmini Stanley is a 36 y.o. female who presents to the hospital because of a seizure. Pt remembers going to the bathroom and putting on her gown and felt as if her face was twisting and her arms were jerking. She does not remember what happened next. According to ER notes: Per EMS they were called by  while the pt was seizing. They state that seizure lasted a few seconds before resolving. After the seizure the pt was post ictal with right sided deficits and slurred speech per EMS. 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 No hx of seizures in the past. Stroke was around 4 yrs ago.      ROS  A ten system review of constitutional, cardiovascular, respiratory, musculoskeletal, endocrine, skin, SHEENT, genitourinary, psychiatric and neurologic systems was obtained and is unremarkable except as stated in HPI     PMH  Past Medical History:   Diagnosis Date    A-fib ([address]     Arthritis     Chronic ulcer of left leg ([address]     H/O tracheostomy     decannulated    Heart murmur     Hemorrhagic stroke ([address] 1/2015    Ill-defined condition     sickle cell    Intracranial hemorrhage ([address] 1/2015    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    PEG (percutaneous endoscopic gastrostomy) status ([address]     removed    Right atrial thrombus     Right sided weakness     from hemorrhagic stroke    Sickle cell disease ([address]        FH  Family History   Problem Relation Age of Onset    Hypertension Other        [address]  Social History     Social History    Marital status: SINGLE     Spouse name: N/A    Number of children: N/A    Years of education: N/A     Social History Main Topics    Smoking status: Never Smoker    Smokeless tobacco: Never Used    Alcohol use No    Drug use: No    Sexual activity: Not Asked     Other Topics Concern    None     Social History Narrative       ALLERGIES  Allergies   Allergen Reactions    Pcn [Penicillins] Hives    Silvadene [Silver Sulfadiazine] Hives    Zantac [Ranitidine Hcl] Itching       PHYSICAL EXAM  EXAMINATION:   Patient Vitals for the past 24 hrs:   Temp Pulse Resp BP SpO2   02/03/18 1245 - (!) 56 - 102/54 99 %   02/03/18 1230 - (!) 58 - 95/50 99 %   02/03/18 1215 - (!) 56 - 96/54 99 %   02/03/18 1200 - (!) 57 - 106/47 96 %   02/03/18 1145 - (!) 57 - 96/50 98 %   02/03/18 1130 - 60 - 95/49 100 %   02/03/18 1115 - 63 - 103/54 100 %   02/03/18 1100 - 64 - 109/58 100 %   02/03/18 1045 - 64 - 99/59 100 %   02/03/18 1015 - 63 - 100/62 99 %   02/03/18 0952 97.4 °F (36.3 °C) (!) 54 18 91/44 99 %   02/03/18 0945 - (!) 56 - (!) 87/48 99 %   02/03/18 0933 97.4 °F (36.3 °C) 60 17 99/48 100 %   02/03/18 0930 - 61 - 97/55 100 %   02/03/18 0915 - 60 - 98/53 99 %   02/03/18 0900 - (!) 58 - 100/58 100 %   02/03/18 0856 - (!) 56 - (!) 88/61 98 %   02/03/18 0845 - (!) 57 - 101/56 99 %   02/03/18 0838 - (!) 59 - 93/54 100 %   02/03/18 0830 97.4 °F (36.3 °C) 69 18 (!) 79/51 99 %   02/03/18 0815 - 61 - (!) 71/36 98 %   02/03/18 0800 - 68 - (!) 81/42 100 %   02/03/18 0745 - 64 - (!) 77/38 99 %   02/03/18 0730 (!) 94.4 °F (34.7 °C) 69 17 (!) 82/47 100 %   02/03/18 0715 - 60 - (!) 74/42 99 %   02/03/18 0700 - 67 - (!) 84/47 98 %   02/03/18 0630 - 62 - (!) 75/38 100 %   02/03/18 0619 - 67 - (!) 85/43 100 %   02/03/18 0615 - 64 - (!) 76/35 100 %   02/03/18 0612 - 63 - (!) 76/38 100 %   02/03/18 0600 - 67 - (!) 79/40 99 %   02/03/18 0549 - 70 - (!) 86/50 100 %   02/03/18 0500 - 78 - 99/49 98 %   02/03/18 0456 - 75 - 94/63 100 %   02/03/18 0422 97.3 °F (36.3 °C) 78 18 101/69 100 %   02/03/18 0300 - 68 21 (!) 80/50 98 %   02/03/18 0200 - 77 18 101/66 100 %   02/03/18 0130 - 75 16 102/74 100 %   02/03/18 0045 - 85 16 (!) 89/67 100 %   02/02/18 2347 - (!) 110 13 - 99 %   02/02/18 2345 - - - 112/89 -   02/02/18 2318 - - - (!) 116/92 -   02/02/18 2300 - (!) 107 14 (!) 86/61 100 %   02/02/18 2230 - (!) 139 15 (!) 80/59 100 %   02/02/18 2200 - (!) 151 16 - 100 %        General:   General appearance: Pt is in no acute distress   Distal pulses are preserved  Fundoscopic exam: attempted    Neurological Examination:   Mental Status:  AAO x3. Speech is fluent. Follows commands, has normal fund of knowledge, attention, short term recall, comprehension and insight. Cranial Nerves: Visual fields are full. PERRL, Extraocular movements are full. Facial sensation intact. Facial movement intact. Hearing intact to conversation. Palate elevates symmetrically. Shoulder shrug symmetric. Tongue midline. Motor: Strength is 5/5 in all 4 ext. Normal tone. No atrophy. Sensation: Normal to light touch    Reflexes: DTRs 2+ throughout. Coordination/Cerebellar: Intact to finger-nose-finger     Gait: deferred    Skin: No significant bruising or lacerations. LAB DATA REVIEWED:    Recent Results (from the past 24 hour(s))   EKG, 12 LEAD, INITIAL    Collection Time: 02/02/18  9:49 PM   Result Value Ref Range    Ventricular Rate 144 BPM    Atrial Rate 136 BPM    QRS Duration 92 ms    Q-T Interval 316 ms    QTC Calculation (Bezet) 489 ms    Calculated R Axis 51 degrees    Calculated T Axis -10 degrees    Diagnosis       Atrial fibrillation with rapid ventricular response  Moderate voltage criteria for LVH, may be normal variant  Nonspecific ST and T wave abnormality  Abnormal ECG  When compared with ECG of 21-APR-2015 13:57,  Atrial fibrillation has replaced Sinus rhythm  Vent.  rate has increased BY  54 BPM  Nonspecific ST and T wave abnormality is now present    Confirmed by Elmer Brambila ([id number]) on 2/3/2018 7:34:11 AM     CBC WITH AUTOMATED DIFF    Collection Time: 02/02/18 10:05 PM   Result Value Ref Range    WBC 20.5 (H) 3.6 - 11.0 K/uL    RBC 2.39 (L) 3.80 - 5.20 M/uL    HGB 6.0 (L) 11.5 - 16.0 g/dL    HCT 17.8 (LL) 35.0 - 47.0 %    MCV 74.5 (L) 80.0 - 99.0 FL    MCH 25.1 (L) 26.0 - 34.0 PG    MCHC 33.7 30.0 - 36.5 g/dL    RDW 20.6 (H) 11.5 - 14.5 %    PLATELET 346 (H) 913 - 400 K/uL    MPV 10.8 8.9 - 12.9 FL    NRBC 0.3 (H) 0  WBC    ABSOLUTE NRBC 0.06 (H) 0.00 - 0.01 K/uL    NEUTROPHILS 78 (H) 32 - 75 %    LYMPHOCYTES 13 12 - 49 %    MONOCYTES 6 5 - 13 %    EOSINOPHILS 0 0 - 7 %    BASOPHILS 0 0 - 1 %    IMMATURE GRANULOCYTES 3 (H) 0.0 - 0.5 %    ABS. NEUTROPHILS 16.0 (H) 1.8 - 8.0 K/UL    ABS. LYMPHOCYTES 2.7 0.8 - 3.5 K/UL    ABS. MONOCYTES 1.2 (H) 0.0 - 1.0 K/UL    ABS. EOSINOPHILS 0.0 0.0 - 0.4 K/UL    ABS. BASOPHILS 0.0 0.0 - 0.1 K/UL    ABS. IMM. GRANS. 0.6 (H) 0.00 - 0.04 K/UL    DF SMEAR SCANNED      RBC COMMENTS TARGET CELLS  3+        RBC COMMENTS WOODROW CELLS  3+        RBC COMMENTS ANISOCYTOSIS  2+        RBC COMMENTS SICKLE CELLS  2+       METABOLIC PANEL, COMPREHENSIVE    Collection Time: 02/02/18 10:05 PM   Result Value Ref Range    Sodium 137 136 - 145 mmol/L    Potassium 3.6 3.5 - 5.1 mmol/L    Chloride 103 97 - 108 mmol/L    CO2 21 21 - 32 mmol/L    Anion gap 13 5 - 15 mmol/L    Glucose 117 (H) 65 - 100 mg/dL    BUN 40 (H) 6 - 20 MG/DL    Creatinine 2.04 (H) 0.55 - 1.02 MG/DL    BUN/Creatinine ratio 20 12 - 20      GFR est AA 32 (L) >60 ml/min/1.73m2    GFR est non-AA 27 (L) >60 ml/min/1.73m2    Calcium 8.2 (L) 8.5 - 10.1 MG/DL    Bilirubin, total 2.9 (H) 0.2 - 1.0 MG/DL    ALT (SGPT) 43 12 - 78 U/L    AST (SGOT) 158 (H) 15 - 37 U/L    Alk.  phosphatase 163 (H) 45 - 117 U/L    Protein, total 8.0 6.4 - 8.2 g/dL    Albumin 2.8 (L) 3.5 - 5.0 g/dL    Globulin 5.2 (H) 2.0 - 4.0 g/dL    A-G Ratio 0.5 (L) 1.1 - 2.2     RETICULOCYTE COUNT    Collection Time: 02/02/18 10:05 PM   Result Value Ref Range    Reticulocyte count 8.1 (H) 0.7 - 2.1 %    Absolute Retic Cnt. 0.1917 (H) 0.0164 - 0.0776 M/ul   LD    Collection Time: 02/02/18 10:05 PM   Result Value Ref Range     (H) 81 - 246 U/L   TYPE + CROSSMATCH    Collection Time: 02/02/18 11:02 PM   Result Value Ref Range    Crossmatch Expiration 02/05/2018     ABO/Rh(D) B POSITIVE     Antibody screen POS     Physician instructions SICKLEDEX NEGATIVE     Comment       Previously identified Anti E, Anti M at [facility] and additonal Anti Cw, Warm Autoantibodies, and a warm unidentified antibody at [facility]     Antibody ID ANTI-C     GARRETT Poly POS     GARRETT IgG POS     GARRETT C3b/C3d NEG     Unit number [id number]     Blood component type RC LR     Unit division 00     Status of unit ISSUED     ANTIGEN/ANTIBODY INFO       C NEGATIVE,  Cw NEGATIVE,  E NEGATIVE,  SOFY NEGATIVE,  M NEGATIVE,      Crossmatch result Compatible     Unit number [id number]     Blood component type RC LR     Unit division 00     Status of unit ALLOCATED     ANTIGEN/ANTIBODY INFO       C NEGATIVE,  Cw NEGATIVE,  E NEGATIVE,  SOFY NEGATIVE,  M NEGATIVE,      Crossmatch result Compatible     Unit number [id number]     Blood component type RC LR,2     Unit division 00     Status of unit ALLOCATED     ANTIGEN/ANTIBODY INFO       C NEGATIVE,  Cw NEGATIVE,  E NEGATIVE,  SOFY NEGATIVE,  M NEGATIVE,      Crossmatch result Compatible     Unit number [id number]     Blood component type RC LR,1     Unit division 00     Status of unit ALLOCATED     ANTIGEN/ANTIBODY INFO       C NEGATIVE,  Cw NEGATIVE,  SOFY NEGATIVE,  M NEGATIVE,  E NEGATIVE,      Crossmatch result Compatible    URINALYSIS W/ REFLEX CULTURE    Collection Time: 02/03/18  2:38 AM   Result Value Ref Range    Color DARK YELLOW      Appearance CLOUDY (A) CLEAR      Specific gravity 1.014 1.003 - 1.030      pH (UA) 5.5 5.0 - 8.0      Protein TRACE (A) NEG mg/dL    Glucose NEGATIVE  NEG mg/dL Ketone NEGATIVE  NEG mg/dL    Blood NEGATIVE  NEG      Urobilinogen 1.0 0.2 - 1.0 EU/dL    Nitrites NEGATIVE  NEG      Leukocyte Esterase NEGATIVE  NEG      WBC 0-4 0 - 4 /hpf    RBC 0-5 0 - 5 /hpf    Epithelial cells FEW FEW /lpf    Bacteria NEGATIVE  NEG /hpf    UA:UC IF INDICATED CULTURE NOT INDICATED BY UA RESULT CNI      Hyaline cast 2-5 0 - 5 /lpf   CULTURE, BLOOD    Collection Time: 02/03/18  2:38 AM   Result Value Ref Range    Special Requests: NO SPECIAL REQUESTS      Culture result: NO GROWTH AFTER 3 HOURS     BILIRUBIN, CONFIRM    Collection Time: 02/03/18  2:38 AM   Result Value Ref Range    Bilirubin UA, confirm NEGATIVE  NEG     CULTURE, BLOOD    Collection Time: 02/03/18  3:00 AM   Result Value Ref Range    Special Requests: NO SPECIAL REQUESTS      Culture result: NO GROWTH AFTER 3 HOURS     METABOLIC PANEL, COMPREHENSIVE    Collection Time: 02/03/18  4:52 AM   Result Value Ref Range    Sodium 139 136 - 145 mmol/L    Potassium 3.8 3.5 - 5.1 mmol/L    Chloride 108 97 - 108 mmol/L    CO2 22 21 - 32 mmol/L    Anion gap 9 5 - 15 mmol/L    Glucose 97 65 - 100 mg/dL    BUN 38 (H) 6 - 20 MG/DL    Creatinine 1.47 (H) 0.55 - 1.02 MG/DL    BUN/Creatinine ratio 26 (H) 12 - 20      GFR est AA 47 (L) >60 ml/min/1.73m2    GFR est non-AA 39 (L) >60 ml/min/1.73m2    Calcium 7.3 (L) 8.5 - 10.1 MG/DL    Bilirubin, total 2.2 (H) 0.2 - 1.0 MG/DL    ALT (SGPT) 41 12 - 78 U/L    AST (SGOT) 130 (H) 15 - 37 U/L    Alk.  phosphatase 149 (H) 45 - 117 U/L    Protein, total 6.5 6.4 - 8.2 g/dL    Albumin 2.1 (L) 3.5 - 5.0 g/dL    Globulin 4.4 (H) 2.0 - 4.0 g/dL    A-G Ratio 0.5 (L) 1.1 - 2.2     CBC WITH AUTOMATED DIFF    Collection Time: 02/03/18  4:52 AM   Result Value Ref Range    WBC 15.9 (H) 3.6 - 11.0 K/uL    RBC 2.03 (L) 3.80 - 5.20 M/uL    HGB 5.2 (LL) 11.5 - 16.0 g/dL    HCT 15.2 (LL) 35.0 - 47.0 %    MCV 74.9 (L) 80.0 - 99.0 FL    MCH 25.6 (L) 26.0 - 34.0 PG    MCHC 34.2 30.0 - 36.5 g/dL    RDW 20.8 (H) 11.5 - 14.5 % PLATELET 443 611 - 407 K/uL    MPV 11.0 8.9 - 12.9 FL    NRBC 0.4 (H) 0  WBC    ABSOLUTE NRBC 0.06 (H) 0.00 - 0.01 K/uL    NEUTROPHILS 86 (H) 32 - 75 %    BAND NEUTROPHILS 4 %    LYMPHOCYTES 6 (L) 12 - 49 %    MONOCYTES 4 (L) 5 - 13 %    EOSINOPHILS 0 0 - 7 %    BASOPHILS 0 0 - 1 %    IMMATURE GRANULOCYTES 0 0.0 - 0.5 %    ABS. NEUTROPHILS 14.3 (H) 1.8 - 8.0 K/UL    ABS. LYMPHOCYTES 1.0 0.8 - 3.5 K/UL    ABS. MONOCYTES 0.6 0.0 - 1.0 K/UL    ABS. EOSINOPHILS 0.0 0.0 - 0.4 K/UL    ABS. BASOPHILS 0.0 0.0 - 0.1 K/UL    ABS. IMM. GRANS. 0.0 0.00 - 0.04 K/UL    DF AUTOMATED      RBC COMMENTS ANISOCYTOSIS  2+        RBC COMMENTS HYPOCHROMIA  2+        RBC COMMENTS POIKILOCYTOSIS  PRESENT        RBC COMMENTS TARGET CELLS  PRESENT            Imaging review:  CT Head  No acute intracranial process. 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 HOME MEDS  Prior to Admission Medications   Prescriptions Last Dose Informant Patient Reported? Taking? morphine CR (MS CONTIN) 100 mg CR tablet 2/2/2018 at Unknown time  Yes Yes   Sig: Take 100 mg by mouth every twelve (12) hours. oxyCODONE IR (ROXICODONE) 30 mg immediate release tablet 2/2/2018 at Unknown time  Yes Yes   Sig: Take 30 mg by mouth every four (4) hours as needed for Pain.       Facility-Administered Medications: None       CURRENT MEDS  Current Facility-Administered Medications   Medication Dose Route Frequency    [START ON 2/4/2018] vancomycin (VANCOCIN) 750 mg in 0.9% sodium chloride (MBP/ADV) 250 mL  750 mg IntraVENous Q24H    0.9% sodium chloride infusion  100 mL/hr IntraVENous CONTINUOUS    cefepime (MAXIPIME) 2 g in 0.9 %  mL IVPB  2 g IntraVENous Q12H    morphine CR (MS CONTIN) tablet 100 mg  100 mg Oral Q12H    sodium chloride (NS) flush 5-10 mL  5-10 mL IntraVENous Q8H    heparin (porcine) injection 5,000 Units  5,000 Units SubCUTAneous K8D    folic acid (FOLVITE) tablet 1 mg  1 mg Oral DAILY    NOREPINephrine (LEVOPHED) 8 mg in dextrose 5% 250 mL infusion  2-16 mcg/min IntraVENous TITRATE    SALINE PERIPHERAL FLUSH Q8H soln 10 mL  10 mL InterCATHeter Q8H    metroNIDAZOLE (FLAGYL) IVPB premix 500 mg  500 mg IntraVENous Q12H    amiodarone (CORDARONE) tablet 400 mg  400 mg Oral BID       IMPRESSION:  Yadi Real is a 36 y.o. female who presents with a seizures. Pt did have a seizure and does have hx of stroke and hence there is a high risk of recurrence Will start the pt on Keppra. Pt has renal dysfunction and hence will start her on keppra 250 mg po bid. RECOMMENDATIONS:  1. EEG  2. Keppra 250 mg po bid. Thank you very much for this consultation.      Micki Galvan MD  Neurologist

## 2018-02-03 NOTE — ED NOTES
EMS reports patient post ictal upon arrival. EMS reports family stating she had seizure like activity for several seconds and during ride was becoming more alert and oriented. Patient has no complaints at this time. Patient updated on plan of care and call bell within reach.

## 2018-02-03 NOTE — PROGRESS NOTES
Pharmacy Medication Reconciliation     The patient was interviewed regarding current PTA medication list, use and drug allergies. Visitor present in room and obtained permission from patient to discuss drug regimen with visitor present. The patient was questioned regarding use of any other inhalers, topical products, over the counter medications, herbal medications, vitamin products or ophthalmic/nasal/otic medication use. Allergy Update: Penicillin, silver sulfadiazine, ranitidine    Preferred Pharmacy: [facility] in [place], [place] (Phone: [phone])    Recommendations/Findings: The following amendments were made to the patient's active medication list on file at [address]:   1) Additions:   - Folic acid 1 mg PO daily  - Rivaroxaban 20 mg PO daily [Patient reported she last took rivaroxaban on 2/2/18; Patient last filled rivaroxaban on 12/1/18 for a 30 day supply which is concerning for medication non-compliance; Rivaroxaban prescription was written on 12/1/18 by Anushka Hampton who is a primary care PA at [facility]; Unclear if the indication for rivaroxaban is AFIB or an additional indication]  - Gabapentin 300 mg PO every evening [Patient reported she last took gabapentin on 2/2/18 evening; Patient last filled gabapentin on 12/1/18 for a 30 day supply which is concerning for medication non-compliance; Gabapentin prescription was also written on 12/1/18 by Anushka Hampton; Patient was unclear of the gabapentin indication]    2) Deletions: None    3) Changes: None    4) Other Pertinent Pharmacy Findings:  - Patient was drowsy during interview but did respond to all questions. Patient did appear somewhat confused but she was able to verbalize strength and frequency of all medication. Without prompting, patient was able to report that she takes morphine ER, oxycodone IR, rivaroxaban, and folic acid. Patient did require prompting regarding gabapentin.   - Per [facility] and the [place] , patient has only used [facility] on [address] to fill prescriptions over the past year. - Patient's fill history of her pain medications appears consistent. - Patient's fill history of her other medications (gabapentin and rivaroxaban) correlates more with prescription non-compliance.    -Clarified PTA med list with the patient, [facility], and [facility]. PTA medication list was corrected to the following:   Prior to Admission Medications   Prescriptions Last Dose Informant Patient Reported? Taking?   folic acid (FOLVITE) 1 mg tablet 2/2/2018 at Unknown time Self Yes Yes   Sig: Take 1 mg by mouth daily. gabapentin (NEURONTIN) 300 mg capsule 2/2/2018 at Unknown time Self Yes Yes   Sig: Take 300 mg by mouth nightly. morphine CR (MS CONTIN) 100 mg CR tablet 2/2/2018 at Unknown time Self Yes Yes   Sig: Take 100 mg by mouth every twelve (12) hours. oxyCODONE IR (ROXICODONE) 30 mg immediate release tablet 2/2/2018 at Unknown time Self Yes Yes   Sig: Take 30 mg by mouth every four (4) hours as needed for Pain.   rivaroxaban (XARELTO) 20 mg tab tablet 2/2/2018 at Unknown time Self Yes Yes   Sig: Take 20 mg by mouth daily.       Facility-Administered Medications: None     Thank you,  Juan Luis Mtz, PHARMD

## 2018-02-03 NOTE — PROGRESS NOTES
Patient stating that her purse was with her in the emergency room and the purse did not come to PCU with the patient. Emergency room charge nurse Brock Cannon notified. Brock Cannon called back to PCU and stated that she spoke with the nurse who transported the patient to PCU and also checked the stretcher that the patient was on in the emergency room and no purse was found. Nursing supervisor notified. Patient advocacy number given to patient.

## 2018-02-03 NOTE — CONSULTS
Subjective:      Date of  Admission: 2/2/2018  9:38 PM     Admission type:Emergency    Vic Perdue is a 36 y.o. female came with seizure. Noted to be in a. Fib and hypotension. Last seen in 2015 but never followed up afterward. Pt herself is drowsy to provide any information. No family member available. Information obtained from review of records.      Patient Active Problem List    Diagnosis Date Noted    Leg wound, left 02/03/2018    Abnormal LFTs 02/03/2018    A-fib ([address] 02/03/2018    Multiple myeloma ([address] 02/03/2018    Seizures ([address] 02/03/2018    Cardiac mass 04/24/2015    Sickle cell crisis (HCC) 04/21/2015    Sickle cell anemia (HCC) 04/21/2015    Sepsis ([address] 04/21/2015    SIRS (systemic inflammatory response syndrome) ([address] 04/21/2015      None  Past Medical History:   Diagnosis Date    A-fib ([address]     Arthritis     Chronic ulcer of left leg ([address]     H/O tracheostomy     decannulated    Heart murmur     Hemorrhagic stroke ([address] 1/2015    Ill-defined condition     sickle cell    Intracranial hemorrhage ([address] 1/2015    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    PEG (percutaneous endoscopic gastrostomy) status ([address]     removed    Right atrial thrombus     Right sided weakness     from hemorrhagic stroke    Sickle cell disease ([address]       Past Surgical History:   Procedure Laterality Date    HX CHOLECYSTECTOMY      HX OTHER SURGICAL  1/2015    PEG    HX OTHER SURGICAL      brain hemorrhage evacuation    HX TONSILLECTOMY      HX TRACHEOSTOMY  1/2015     Allergies   Allergen Reactions    Pcn [Penicillins] Hives    Silvadene [Silver Sulfadiazine] Hives    Zantac [Ranitidine Hcl] Itching      Family History   Problem Relation Age of Onset    Hypertension Other       Current Facility-Administered Medications   Medication Dose Route Frequency    [START ON 2/4/2018] vancomycin (VANCOCIN) 750 mg in 0.9% sodium chloride (MBP/ADV) 250 mL  750 mg IntraVENous Q24H  0.9% sodium chloride infusion  125 mL/hr IntraVENous CONTINUOUS    cefepime (MAXIPIME) 2 g in 0.9 %  mL IVPB  2 g IntraVENous Q12H    metroNIDAZOLE (FLAGYL) IVPB premix 500 mg  500 mg IntraVENous Q8H    levalbuterol (XOPENEX) nebulizer soln 1.25 mg/3 mL  1.25 mg Nebulization Q6H PRN    HYDROmorphone (DILAUDID) injection 1 mg  1 mg IntraVENous Q3H PRN    sodium chloride (NS) flush 5-10 mL  5-10 mL IntraVENous Q8H    sodium chloride (NS) flush 5-10 mL  5-10 mL IntraVENous PRN    acetaminophen (TYLENOL) tablet 650 mg  650 mg Oral Q6H PRN    ondansetron (ZOFRAN) injection 4 mg  4 mg IntraVENous Q4H PRN    heparin (porcine) injection 5,000 Units  5,000 Units SubCUTAneous Q8H    LORazepam (ATIVAN) injection 1 mg  1 mg IntraVENous R7E PRN    folic acid (FOLVITE) tablet 1 mg  1 mg Oral DAILY    0.9% sodium chloride infusion 250 mL  250 mL IntraVENous PRN    NOREPINephrine (LEVOPHED) 8 mg in dextrose 5% 250 mL infusion  2-16 mcg/min IntraVENous TITRATE    saline peripheral flush soln 10 mL  10 mL InterCATHeter PRN    SALINE PERIPHERAL FLUSH Q8H soln 10 mL  10 mL InterCATHeter Q8H    amiodarone (CORDARONE) 375 mg/250 mL D5W infusion  1 mg/min IntraVENous CONTINUOUS    0.9% sodium chloride infusion 250 mL  250 mL IntraVENous PRN         Review of Symptoms:  Unable to perform due to patient clinical condition. Subjective:      Visit Vitals    BP 91/44 (BP 1 Location: Left arm)    Pulse (!) 54    Temp 97.4 °F (36.3 °C)    Resp 18    Ht 5' 4\" (1.626 m)    Wt 100 lb (45.4 kg)    SpO2 99%    BMI 17.16 kg/m2       Physical Exam  Abdomen: soft, non-tender. Bowel sounds normal.   Extremities: no cyanosis or edema.    Heart: regular rate and rhythm, S1, S2 normal, no murmur, click, rub or gallop  Lungs: clear to auscultation bilaterally  Neck: supple, no carotid bruit and no JVD   Neurologic: drowsy      Cardiographics    Telemetry: normal sinus rhythm    ECG: atrial fibrillation with RVR    Echocardiogram: Not done    Labs:   Recent Results (from the past 24 hour(s))   EKG, 12 LEAD, INITIAL    Collection Time: 02/02/18  9:49 PM   Result Value Ref Range    Ventricular Rate 144 BPM    Atrial Rate 136 BPM    QRS Duration 92 ms    Q-T Interval 316 ms    QTC Calculation (Bezet) 489 ms    Calculated R Axis 51 degrees    Calculated T Axis -10 degrees    Diagnosis       Atrial fibrillation with rapid ventricular response  Moderate voltage criteria for LVH, may be normal variant  Nonspecific ST and T wave abnormality  Abnormal ECG  When compared with ECG of 21-APR-2015 13:57,  Atrial fibrillation has replaced Sinus rhythm  Vent. rate has increased BY  54 BPM  Nonspecific ST and T wave abnormality is now present    Confirmed by Sophia Berg ([id number]) on 2/3/2018 7:34:11 AM     CBC WITH AUTOMATED DIFF    Collection Time: 02/02/18 10:05 PM   Result Value Ref Range    WBC 20.5 (H) 3.6 - 11.0 K/uL    RBC 2.39 (L) 3.80 - 5.20 M/uL    HGB 6.0 (L) 11.5 - 16.0 g/dL    HCT 17.8 (LL) 35.0 - 47.0 %    MCV 74.5 (L) 80.0 - 99.0 FL    MCH 25.1 (L) 26.0 - 34.0 PG    MCHC 33.7 30.0 - 36.5 g/dL    RDW 20.6 (H) 11.5 - 14.5 %    PLATELET 611 (H) 909 - 400 K/uL    MPV 10.8 8.9 - 12.9 FL    NRBC 0.3 (H) 0  WBC    ABSOLUTE NRBC 0.06 (H) 0.00 - 0.01 K/uL    NEUTROPHILS 78 (H) 32 - 75 %    LYMPHOCYTES 13 12 - 49 %    MONOCYTES 6 5 - 13 %    EOSINOPHILS 0 0 - 7 %    BASOPHILS 0 0 - 1 %    IMMATURE GRANULOCYTES 3 (H) 0.0 - 0.5 %    ABS. NEUTROPHILS 16.0 (H) 1.8 - 8.0 K/UL    ABS. LYMPHOCYTES 2.7 0.8 - 3.5 K/UL    ABS. MONOCYTES 1.2 (H) 0.0 - 1.0 K/UL    ABS. EOSINOPHILS 0.0 0.0 - 0.4 K/UL    ABS. BASOPHILS 0.0 0.0 - 0.1 K/UL    ABS. IMM.  GRANS. 0.6 (H) 0.00 - 0.04 K/UL    DF SMEAR SCANNED      RBC COMMENTS TARGET CELLS  3+        RBC COMMENTS WOODROW CELLS  3+        RBC COMMENTS ANISOCYTOSIS  2+        RBC COMMENTS SICKLE CELLS  2+       METABOLIC PANEL, COMPREHENSIVE    Collection Time: 02/02/18 10:05 PM   Result Value Ref Range    Sodium 137 136 - 145 mmol/L    Potassium 3.6 3.5 - 5.1 mmol/L    Chloride 103 97 - 108 mmol/L    CO2 21 21 - 32 mmol/L    Anion gap 13 5 - 15 mmol/L    Glucose 117 (H) 65 - 100 mg/dL    BUN 40 (H) 6 - 20 MG/DL    Creatinine 2.04 (H) 0.55 - 1.02 MG/DL    BUN/Creatinine ratio 20 12 - 20      GFR est AA 32 (L) >60 ml/min/1.73m2    GFR est non-AA 27 (L) >60 ml/min/1.73m2    Calcium 8.2 (L) 8.5 - 10.1 MG/DL    Bilirubin, total 2.9 (H) 0.2 - 1.0 MG/DL    ALT (SGPT) 43 12 - 78 U/L    AST (SGOT) 158 (H) 15 - 37 U/L    Alk.  phosphatase 163 (H) 45 - 117 U/L    Protein, total 8.0 6.4 - 8.2 g/dL    Albumin 2.8 (L) 3.5 - 5.0 g/dL    Globulin 5.2 (H) 2.0 - 4.0 g/dL    A-G Ratio 0.5 (L) 1.1 - 2.2     RETICULOCYTE COUNT    Collection Time: 02/02/18 10:05 PM   Result Value Ref Range    Reticulocyte count 8.1 (H) 0.7 - 2.1 %    Absolute Retic Cnt. 0.1917 (H) 0.0164 - 0.0776 M/ul   LD    Collection Time: 02/02/18 10:05 PM   Result Value Ref Range     (H) 81 - 246 U/L   TYPE + CROSSMATCH    Collection Time: 02/02/18 11:02 PM   Result Value Ref Range    Crossmatch Expiration 02/05/2018     ABO/Rh(D) B POSITIVE     Antibody screen POS     Physician instructions SICKLEDEX NEGATIVE     Comment       Previously identified Anti E, Anti M at [employer] and additonal Anti Cw, Warm Autoantibodies, and a warm [place] [facility] [place]     Antibody ID ANTI-C     GARRETT Poly POS     GARRETT IgG POS     GARRETT C3b/C3d NEG     Unit number [id number]     Blood component type RC LR     Unit division 00     Status of unit ISSUED     ANTIGEN/ANTIBODY INFO       C NEGATIVE,  Cw NEGATIVE,  E NEGATIVE,  SOFY NEGATIVE,  M NEGATIVE,      Crossmatch result Compatible     Unit number [id number]     Blood component type RC LR     Unit division 00     Status of unit ALLOCATED     ANTIGEN/ANTIBODY INFO       C NEGATIVE,  Cw NEGATIVE,  E NEGATIVE,  SOFY NEGATIVE,  M NEGATIVE,      Crossmatch result Compatible    URINALYSIS W/ REFLEX CULTURE Collection Time: 02/03/18  2:38 AM   Result Value Ref Range    Color DARK YELLOW      Appearance CLOUDY (A) CLEAR      Specific gravity 1.014 1.003 - 1.030      pH (UA) 5.5 5.0 - 8.0      Protein TRACE (A) NEG mg/dL    Glucose NEGATIVE  NEG mg/dL    Ketone NEGATIVE  NEG mg/dL    Blood NEGATIVE  NEG      Urobilinogen 1.0 0.2 - 1.0 EU/dL    Nitrites NEGATIVE  NEG      Leukocyte Esterase NEGATIVE  NEG      WBC 0-4 0 - 4 /hpf    RBC 0-5 0 - 5 /hpf    Epithelial cells FEW FEW /lpf    Bacteria NEGATIVE  NEG /hpf    UA:UC IF INDICATED CULTURE NOT INDICATED BY UA RESULT CNI      Hyaline cast 2-5 0 - 5 /lpf   CULTURE, BLOOD    Collection Time: 02/03/18  2:38 AM   Result Value Ref Range    Special Requests: NO SPECIAL REQUESTS      Culture result: NO GROWTH AFTER 3 HOURS     BILIRUBIN, CONFIRM    Collection Time: 02/03/18  2:38 AM   Result Value Ref Range    Bilirubin UA, confirm NEGATIVE  NEG     CULTURE, BLOOD    Collection Time: 02/03/18  3:00 AM   Result Value Ref Range    Special Requests: NO SPECIAL REQUESTS      Culture result: NO GROWTH AFTER 3 HOURS     METABOLIC PANEL, COMPREHENSIVE    Collection Time: 02/03/18  4:52 AM   Result Value Ref Range    Sodium 139 136 - 145 mmol/L    Potassium 3.8 3.5 - 5.1 mmol/L    Chloride 108 97 - 108 mmol/L    CO2 22 21 - 32 mmol/L    Anion gap 9 5 - 15 mmol/L    Glucose 97 65 - 100 mg/dL    BUN 38 (H) 6 - 20 MG/DL    Creatinine 1.47 (H) 0.55 - 1.02 MG/DL    BUN/Creatinine ratio 26 (H) 12 - 20      GFR est AA 47 (L) >60 ml/min/1.73m2    GFR est non-AA 39 (L) >60 ml/min/1.73m2    Calcium 7.3 (L) 8.5 - 10.1 MG/DL    Bilirubin, total 2.2 (H) 0.2 - 1.0 MG/DL    ALT (SGPT) 41 12 - 78 U/L    AST (SGOT) 130 (H) 15 - 37 U/L    Alk.  phosphatase 149 (H) 45 - 117 U/L    Protein, total 6.5 6.4 - 8.2 g/dL    Albumin 2.1 (L) 3.5 - 5.0 g/dL    Globulin 4.4 (H) 2.0 - 4.0 g/dL    A-G Ratio 0.5 (L) 1.1 - 2.2     CBC WITH AUTOMATED DIFF    Collection Time: 02/03/18  4:52 AM   Result Value Ref Range WBC 15.9 (H) 3.6 - 11.0 K/uL    RBC 2.03 (L) 3.80 - 5.20 M/uL    HGB 5.2 (LL) 11.5 - 16.0 g/dL    HCT 15.2 (LL) 35.0 - 47.0 %    MCV 74.9 (L) 80.0 - 99.0 FL    MCH 25.6 (L) 26.0 - 34.0 PG    MCHC 34.2 30.0 - 36.5 g/dL    RDW 20.8 (H) 11.5 - 14.5 %    PLATELET 871 358 - 868 K/uL    MPV 11.0 8.9 - 12.9 FL    NRBC 0.4 (H) 0  WBC    ABSOLUTE NRBC 0.06 (H) 0.00 - 0.01 K/uL    NEUTROPHILS 86 (H) 32 - 75 %    BAND NEUTROPHILS 4 %    LYMPHOCYTES 6 (L) 12 - 49 %    MONOCYTES 4 (L) 5 - 13 %    EOSINOPHILS 0 0 - 7 %    BASOPHILS 0 0 - 1 %    IMMATURE GRANULOCYTES 0 0.0 - 0.5 %    ABS. NEUTROPHILS 14.3 (H) 1.8 - 8.0 K/UL    ABS. LYMPHOCYTES 1.0 0.8 - 3.5 K/UL    ABS. MONOCYTES 0.6 0.0 - 1.0 K/UL    ABS. EOSINOPHILS 0.0 0.0 - 0.4 K/UL    ABS. BASOPHILS 0.0 0.0 - 0.1 K/UL    ABS. IMM. GRANS. 0.0 0.00 - 0.04 K/UL    DF AUTOMATED      RBC COMMENTS ANISOCYTOSIS  2+        RBC COMMENTS HYPOCHROMIA  2+        RBC COMMENTS POIKILOCYTOSIS  PRESENT        RBC COMMENTS TARGET CELLS  PRESENT            Assessment:     Assessment:       Active Problems:    Sickle cell crisis ([facility] (4/21/2015)      Sickle cell anemia (HCC) (4/21/2015)      Sepsis ([facility] (4/21/2015)      Leg wound, left (2/3/2018)      Abnormal LFTs (2/3/2018)      A-fib ([facility] (2/3/2018)      Multiple myeloma ([facility] (2/3/2018)      Seizures ([facility] (2/3/2018)         Plan:     1. A. Fib with RVR: now in sinus rhythm. Switch amio to PO. With hypotension and renal insuff, not much other option. Check Echo. No AC due to h/o ICH. Wean off pressors. After last evaluation she never followed up for cardiac MRI to assess for right atrial mass. Will address it once acute issues resolved. 2. Sickle cell, anemia: getting BT. Hematology consulted. 3. Renal insuff: IV hydration. Cr improving. 4. Sepsis: per hospitalist. On abx.

## 2018-02-03 NOTE — PROGRESS NOTES
Pharmacy Automatic Renal Dosing Protocol - Antimicrobials    Indication for Antimicrobials: Left leg infection      Current Regimen of Each Antimicrobial:  Cefepime 2 g IV every 12 hours (Start Date 23; Day # 1)  Metronidazole 500 mg IV every 8 hours (Start Date 23; Day # 1)  Vancomycin 750 mg IV every 24 hours (Start Date 23; Day # 1)    Previous Antimicrobial Therapy:  None    Goal Level: VANCOMYCIN TROUGH GOAL RANGE  Vancomycin Trough: 15 - 20 mcg/mL (for now)  Measured / Extrapolated Vancomycin Level: None    Significant Cultures:   3 Blood: NGTD 3 hours- pending  3 Wound: pending    Paralysis, amputations, malnutrition: None documented    Labs:  Recent Labs      18   0452  18   2205   CREA  1.47*  2.04*   BUN  38*  40*   WBC  15.9*  20.5*     Temp (24hrs), Av.8 °F (36 °C), Min:94.4 °F (34.7 °C), Max:97.4 °F (36.3 °C)      Creatinine Clearance (mL/min) or Dialysis: 35 mL/min   No results found for: PCT    Impression/Plan: Will change metronidazole to 500 mg IV every 12 hours for indication per protocol. Will continue current regimen as appropriate for indication and renal function. Pharmacy will follow daily and adjust medications as appropriate for renal function and/or serum levels. Thank you,  Demetri Field, PHARMD          Recommended duration of therapy  [email or web address]/Pharmacy/Clinical%20Companion/Duration%20of%20ABX%20therapy. docx    Renal Dosing  [email or web address]/[place]/[facility]/Pharmacy/Clinical%20Companion/Renal%[id number]. pdf

## 2018-02-03 NOTE — PROGRESS NOTES
Hospitalist Progress Note    NAME: Jailene Martínez   :  1975   MRN:  [id number]       Assessment / Plan:  Sepsis due to left leg infection in setting of chronic LLE wound, present on admission:  Low blood pressure and hypothermia this morning  - CXR with mild pulmonary edema pattern, will decrease IVF  - levophed added, transfer to ICU if not improving in blood pressure. D/w Pt. - jaylin cowan  - con't emperic IV vancomycin, cefepime and flagyl for now  - blood and wound cultures pending, will follow  Possible seizures with h/o ICH and remote CVA:  None since admission, no reported history of seizure  - CT head with no acute intracranial process. 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 - neuro consulted  - EEG requested  - prn ativan for now  Afib with RVR and history of R atrial mass: converted to NSR  - converting to oral amiodarone  - no anticoagulation with h/o ICH  - appreciate cardiology consult  - will likely need echo to re-evaluate atrial mass while here or in close f/u  YEN:  Improving Cr  - UA on admission is benign  - con't IV fluids  - had urinary retention (350mL) on admission, will try to d/c garcia tomorrow if she is improving and able to be mobilized  Sickle cell crisis with severe anemia:  - transfusing 1u pRBCs today, lab working on obtaining additional units if needed  - appreciate hematology consult  Abnormal LFTs: suspect due to sepsis and sickling, con't to monitor. Will send hepatitis panel, consider US abdomen if not improving   Lymphoma of the stomach: Follows at [facility], plans for LN biopsy in the near future, but not currently on treatment  Underweight (Body mass index is 17.16 kg/(m^2)     Code Status: Full    DVT Prophylaxis: Lovenox     Subjective:     Chief Complaint / Reason for Physician Visit  \"I feel really tired and weak\". Discussed with RN events overnight.      Review of Systems:  Symptom Y/N Comments  Symptom Y/N Comments   Fever/Chills n   Chest Pain n    Poor Appetite n   Edema n    Cough n   Abdominal Pain n    Sputum n   Joint Pain     SOB/DELGADO n   Pruritis/Rash     Nausea/vomit    Tolerating PT/OT     Diarrhea    Tolerating Diet     Constipation    Other       Could NOT obtain due to:      Objective:     VITALS:   Last 24hrs VS reviewed since prior progress note.  Most recent are:  Patient Vitals for the past 24 hrs:   Temp Pulse Resp BP SpO2   02/03/18 1200 - (!) 57 - 106/47 96 %   02/03/18 1145 - (!) 57 - 96/50 98 %   02/03/18 1130 - 60 - 95/49 100 %   02/03/18 1115 - 63 - 103/54 100 %   02/03/18 1100 - 64 - 109/58 100 %   02/03/18 1045 - 64 - 99/59 100 %   02/03/18 1015 - 63 - 100/62 99 %   02/03/18 0952 97.4 °F (36.3 °C) (!) 54 18 91/44 99 %   02/03/18 0945 - (!) 56 - (!) 87/48 99 %   02/03/18 0933 97.4 °F (36.3 °C) 60 17 99/48 100 %   02/03/18 0930 - 61 - 97/55 100 %   02/03/18 0915 - 60 - 98/53 99 %   02/03/18 0900 - (!) 58 - 100/58 100 %   02/03/18 0856 - (!) 56 - (!) 88/61 98 %   02/03/18 0845 - (!) 57 - 101/56 99 %   02/03/18 0838 - (!) 59 - 93/54 100 %   02/03/18 0830 97.4 °F (36.3 °C) 69 18 (!) 79/51 99 %   02/03/18 0815 - 61 - (!) 71/36 98 %   02/03/18 0800 - 68 - (!) 81/42 100 %   02/03/18 0745 - 64 - (!) 77/38 99 %   02/03/18 0730 (!) 94.4 °F (34.7 °C) 69 17 (!) 82/47 100 %   02/03/18 0715 - 60 - (!) 74/42 99 %   02/03/18 0700 - 67 - (!) 84/47 98 %   02/03/18 0630 - 62 - (!) 75/38 100 %   02/03/18 0619 - 67 - (!) 85/43 100 %   02/03/18 0615 - 64 - (!) 76/35 100 %   02/03/18 0612 - 63 - (!) 76/38 100 %   02/03/18 0600 - 67 - (!) 79/40 99 %   02/03/18 0549 - 70 - (!) 86/50 100 %   02/03/18 0500 - 78 - 99/49 98 %   02/03/18 0456 - 75 - 94/63 100 %   02/03/18 0422 97.3 °F (36.3 °C) 78 18 101/69 100 %   02/03/18 0300 - 68 21 (!) 80/50 98 %   02/03/18 0200 - 77 18 101/66 100 %   02/03/18 0130 - 75 16 102/74 100 %   02/03/18 0045 - 85 16 (!) 89/67 100 %   02/02/18 2347 - (!) 110 13 - 99 %   02/02/18 2345 - - - 112/89 -   02/02/18 2318 - - - (!) 116/92 - 02/02/18 2300 - (!) 107 14 (!) 86/61 100 %   02/02/18 2230 - (!) 139 15 (!) 80/59 100 %   02/02/18 2200 - (!) 151 16 - 100 %       Intake/Output Summary (Last 24 hours) at 02/03/18 1212  Last data filed at 02/03/18 1203   Gross per 24 hour   Intake          1086.75 ml   Output              600 ml   Net           486.75 ml        PHYSICAL EXAM:  General: WD, WN. Alert, cooperative, no acute distress    EENT:  EOMI. Anicteric sclerae. MMM  Resp:  CTA bilaterally, no wheezing or rales. No accessory muscle use  CV:  Regular  rhythm,  bilateral pedal edema  GI:  Soft, non distended, Non tender.  +Bowel sounds  Neurologic:  Alert and oriented X 3, normal speech,   Psych:   Good insight. Not anxious nor agitated  Skin:  No rashes. No jaundice, LLL dressing c/d/i    Reviewed most current lab test results and cultures  YES  Reviewed most current radiology test results   YES  Review and summation of old records today    NO  Reviewed patient's current orders and MAR    YES  PMH/SH reviewed - no change compared to H&P  ________________________________________________________________________  Care Plan discussed with:    Comments   Patient x    Family      RN x    Care Manager     Consultant                        Multidiciplinary team rounds were held today with , nursing, pharmacist and clinical coordinator. Patient's plan of care was discussed; medications were reviewed and discharge planning was addressed.      ________________________________________________________________________  Total NON critical care TIME:   Minutes    Total CRITICAL CARE TIME Spent:  35 Minutes non procedure based (0622-6146)      Comments   >50% of visit spent in counseling and coordination of care x    ________________________________________________________________________  Chay Aponte MD     Procedures: see electronic medical records for all procedures/Xrays and details which were not copied into this note but were reviewed prior to creation of Plan. LABS:  I reviewed today's most current labs and imaging studies.   Pertinent labs include:  Recent Labs      02/03/18 0452 02/02/18 2205   WBC  15.9*  20.5*   HGB  5.2*  6.0*   HCT  15.2*  17.8*   PLT  377  425*     Recent Labs      02/03/18 0452 02/02/18 2205   NA  139  137   K  3.8  3.6   CL  108  103   CO2  22  21   GLU  97  117*   BUN  38*  40*   CREA  1.47*  2.04*   CA  7.3*  8.2*   ALB  2.1*  2.8*   TBILI  2.2*  2.9*   SGOT  130*  158*   ALT  41  43       Signed: Yuval Pack MD

## 2018-02-03 NOTE — PROGRESS NOTES
Initial Nutrition Assessment:    INTERVENTIONS/RECOMMENDATIONS:   · Continue current diet  · Ensure Enlive BID  · Encourage PO intake     ASSESSMENT:   Chart reviewed, medically noted for seizures, Sickle cell crisis & Sickle cell anemia, Multiple myeloma and PMH shown below. Nutrition referral triggered due to MST score however pt was sleeping during visit attempts, unable to obtain nutrition history at this time. There are no recent weights in weight history to compare to assess weight loss. Pt BMI is in the underweight category however she was in this category in 2015 as well. Unsure of current weight source. BLE could be masking true weight. Will follow-up with pt when she is more awake. Will add nutrition supplements in the mean time. Past Medical History:   Diagnosis Date    A-fib [facility])     Arthritis     Chronic ulcer of left leg ([facility]     H/O tracheostomy     decannulated    Heart murmur     Hemorrhagic stroke ([facility] [facility] 1/2015    Ill-defined condition     sickle cell    Intracranial hemorrhage ([facility] [address] 1/2015    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[facility] [address] 1/2015    PEG (percutaneous endoscopic gastrostomy) status ([facility] [address] [facility]     removed    Right atrial thrombus     Right sided weakness     from hemorrhagic stroke    Sickle cell disease ([facility] [address] [facility]        Diet Order: Regular  % Eaten:  No data found.     Pertinent Medications: [x]Reviewed: NS, folic acid, levo (5 mcg/min)  Pertinent Labs: [x]Reviewed:   Food Allergies: [x]NKFA  []Other   Last BM: 2/2  Edema:        []RUE   []LUE   [x]RLE 2+  [x]LLE 2+      Pressure Injury:      [] Stage I   [] Stage II   [] Stage III   [] Stage IV      Wt Readings from Last 30 Encounters:   02/02/18 45.4 kg (100 lb)   12/26/15 48 kg (105 lb 13.1 oz)   04/27/15 44 kg (97 lb)   04/21/15 43.3 kg (95 lb 7.4 oz)   07/10/13 55.1 kg (121 lb 7.6 oz)       Anthropometrics:   Height: 5' 4\" (162.6 cm) Weight: 45.4 kg (100 lb)   IBW (%IBW):   ( ) UBW (%UBW):   (  %)   Last Weight Metrics:  Weight Loss Metrics 2/2/2018 12/26/2015 4/27/2015 4/21/2015 7/10/2013   Today's Wt 100 lb 105 lb 13.1 oz 97 lb 95 lb 7.4 oz 121 lb 7.6 oz   BMI 17.16 kg/m2 18.16 kg/m2 16.64 kg/m2 16.38 kg/m2 20.84 kg/m2       BMI: Body mass index is 17.16 kg/(m^2). This BMI is indicative of:   [x]Underweight    []Normal    []Overweight    [] Obesity   [] Extreme Obesity (BMI>40)     Estimated Nutrition Needs (Based on):   1650 Kcals/day (BMR: 1100 x 1.5) , 45 g (1 g/kg) Protein  Carbohydrate: At Least 130 g/day  Fluids: 1650 mL/day (1ml/kcal) or per primary team    NUTRITION DIAGNOSES:   Problem:  Underweight      Etiology: related to unknown etiology at this time     Signs/Symptoms: as evidenced by BMI 17.2      NUTRITION INTERVENTIONS:  Meals/Snacks: General/healthful diet   Supplements: Commercial supplement              GOAL:   consume >50% of meals and ONS in 2-4 days    LEARNING NEEDS (Diet, Food/Nutrient-Drug Interaction):    [x] None Identified   [] Identified and Education Provided/Documented   [] Identified and Pt declined/was not appropriate     Cultureal, Anglican, OR Ethnic Dietary Needs:    [x] None Identified   [] Identified and Addressed     [x] Interdisciplinary Care Plan Reviewed/Documented    [x] Discharge Planning:  General healthy diet with kcal and protein dense foods     MONITORING /EVALUATION:      Food/Nutrient Intake Outcomes:  Total energy intake  Physical Signs/Symptoms Outcomes: Weight/weight change, Electrolyte and renal profile, Glucose profile, GI    NUTRITION RISK:    [x] High              [] Moderate           []  Low  []  Minimal/Uncompromised    PT SEEN FOR:    []  MD Consult: []Calorie Count      []Diabetic Diet Education        []Diet Education     []Electrolyte Management     []General Nutrition Management and Supplements     []Management of Tube Feeding     []TPN Recommendations    [x]  RN Referral:  [x]MST score >=2     []Enteral/Parenteral Nutrition PTA     []Pregnant: Gestational DM or Multigestation     []Pressure Ulcer/Wound Care needs        [x]  Low BMI  []  LOS Referral       Deedee Chahal RDN  Pager [phone]  Weekend Pager [phone]

## 2018-02-03 NOTE — PROGRESS NOTES
MIDLINE Insertion Procedure  Note:   Procedure explained to  patient  along with risks and benefits. Procedure teaching completed. Pre procedure assessment done. Maximum sterile barrier precautions observed throughout procedure. Lidocaine 1 %  4  ml sc injected to site prior to access the vein . Cannulated   brachial   vein using ultrasound guidance. Inserted 4   Fr.  single  lumen midline to  right  arm. Blood return verified and port flushed with 20ml normal saline . Sterile dressing applied with biopatch, statLock and occlusive dressing as per protocol. Curos caps applied to port. Patient tolerated procedure well with minimal blood loss. Reason for access : Reliable IV Access   Complications related to insertion : None   This midline to be removed on or before   March 4, 2018  See nursing message  for midline reminders  Inserted by : Lita King RN. Vascular Access Nurse  Assisted by :  Becky Villanueva RN PICC Nurse  Total Length :  12 cm   External Length :  0    cm   Arm circumference :    20   cm  Catheter occupies   16   % of vein. Type of Midline: Bard Power Midline Catheter  Ref#:  [id number]   Lot#:    [id number]  Expiration Date:    2019-05-31     Lita King RN . PICC nurse.  [employer]

## 2018-02-03 NOTE — PROGRESS NOTES
0805 - DR Alexandra Owens AWARE OF CONSULT - MD AWARE OF AMIODARONE ON HOLD AT THIS TIME DUE TO HYPOTENSION.     0813 - CONSULT PLACED TO ON CALL NEUROLOGY - DR Soheila Gama AT THIS TIME.      [address] TO ON CALL HEMATOLOGY - DR AJ AT THIS TIME.     0816 - [facility] AWARE OF URGENT NEED FOR BLOOD PRODUCTS - LAB REPRESENTATIVE STATES ETA 2 HOURS. DR Linnea Willingham. HOUSE SUPERVISOR [facility]. MD AWARE OF PT CONDITION - AWAITING BED PLACEMENT IN CCU.    0618 - Verbal shift change report given to JUSTIN HERBERT (oncoming nurse) by Chloe Ibanez RN (offgoing nurse). Report included the following information SBAR, Kardex, Procedure Summary, Intake/Output, MAR, Accordion, Recent Results, Med Rec Status, Cardiac Rhythm SB/SR and Alarm Parameters . PT TO [address] [place] [address].     0902 - PICC TEAM AT BEDSIDE AT PLACE LINE BEFORE MOVEMENT TO CCU. 1010 - DR BETHEA UPDATED ON PT CONDITION - BP TOLERATING LOW DOSE LEVOPHED - 1UNIT PRBC INFUSING - CANCEL TX ORDER AT THIS TIME BASED ON PT CONDITION. 1825 - DR BETHEA AWARE OF PT MOST RECENT H&H RESULT, CONTINUED LOW DOSE LEVOPHED GTT, AND LETHARGY POST PAIN MEDICATION ADMINISTRATION. MD TO ADJUST PAIN MEDICATION REGIMEN.

## 2018-02-03 NOTE — PROGRESS NOTES
PCU SHIFT NURSING NOTE      TRANSFER - IN REPORT:    Verbal report received from [id number] RN(name) on Chaitanya Grade  being received from ED(unit) for routine progression of care      Report consisted of patients Situation, Background, Assessment and   Recommendations(SBAR). Information from the following report(s) SBAR, Kardex, Intake/Output, Recent Results and Cardiac Rhythm NSR/sinus tach  was reviewed with the receiving nurse. Opportunity for questions and clarification was provided. Assessment completed upon patients arrival to unit and care assumed. Primary Nurse Gustavo Adorno and Freda Gar RN performed a dual skin assessment on this patient Impairment noted- see wound doc flow sheet   Pt has an open on on her left BLE. Xeroform dressing with jose administered. Wound culture sent. Shashi score is 15            Shift Summary:   0422: Pt arrived from the ED to room 2243. Patient alert and oriented x4. Complaints of generalized pain 7/10. Left leg wound culture sent. VSS. Call bell in reach. Will monitor   0430: Attempted to call blood bank. Patients blood has been sent out to [facility] for further testing   0509: Critical Hgb 5.2, Hematocrit 15.2. On call paged. 3103: MD made aware of patients critical lab values. No orders received   0612: SBP 75/38, HR 69. Patient is asymptomatic, no complaints at this time. MD paged   9750: MD paged. Orders received for 500ml bolus  0645: BP 84/47, HR 69 after bolus. Attempted to call blood bank. Blood in route from [address]: Report given to [facility]]. Call bell in reach    0810: Dr. Dennis Pena made aware of nights events.  MD to round shortly       Admission Date 2/2/2018   Admission Diagnosis Sepsis ([facility] [address]   Consults IP CONSULT TO NEUROLOGY  IP CONSULT TO HEMATOLOGY  IP CONSULT TO CARDIOLOGY        Consults   []PT   []OT   []Speech   []Case Management      [] Palliative      Cardiac Monitoring Order   []Yes   []No     IV drips   []Yes    Drip: Dose:  Drip:                            Dose:  Drip:                            Dose:   []No     GI Prophylaxis   []Yes   []No         DVT Prophylaxis   SCDs:             Nico stockings:         [] Medication   []Contraindicated   []None      Activity Level           Purposeful Rounding every 1-2 hour? []Yes   Goldstein Score  Total Score: 1   Bed Alarm (If score 3 or >)   []Yes   [] Refused (See signed refusal form in chart)   Shashi Score  Shashi Score: 15   Shashi Score (if score 14 or less)   []PMT consult   []Wound Care consult      []Specialty bed   [] Nutrition consult          Needs prior to discharge:   Home O2 required:    []Yes   []No    If yes, how much O2 required? Other:    Last Bowel Movement:        Influenza Vaccine Received Flu Vaccine for Current Season (usually Sept-March): Yes        Pneumonia Vaccine           Diet Active Orders   Diet    DIET REGULAR      LDAs             Venous Access Device Porticath 02/02/18 Upper chest (subclavicular area, right (Active)      Peripheral IV 02/02/18 Left Arm (Active)   Site Assessment Clean, dry, & intact 2/3/2018  3:07 AM   Phlebitis Assessment 0 2/3/2018  3:07 AM   Infiltration Assessment 0 2/3/2018  3:07 AM   Dressing Status Clean, dry, & intact 2/3/2018  3:07 AM   Dressing Type Tape;Transparent 2/3/2018  3:07 AM   Hub Color/Line Status Pink;Flushed;Patent 2/3/2018  3:07 AM                      Urinary Catheter Urinary Catheter 02/03/18 Martinez-Criteria for Appropriate Use: Medically/surgically unstable    Intake & Output        Readmission Risk Assessment Tool Score Low Risk            12       Total Score        3 Has Seen PCP in Last 6 Months (Yes=3, No=0)    9 Pt. Coverage (Medicare=5 , Medicaid, or Self-Pay=4)        Criteria that do not apply:    . Living with Significant Other. Assisted Living. LTAC. SNF.  or   Rehab    Patient Length of Stay (>5 days = 3)    IP Visits Last 12 Months (1-3=4, 4=9, >4=11)    Charlson Comorbidity Score (Age + Comorbid Conditions)       Expected Length of Stay - - -   Actual Length of Stay 0

## 2018-02-03 NOTE — ED NOTES
TRANSFER - OUT REPORT:    Verbal report given to Rachel RN(name) on Noelle Villalpando  being transferred to PCU(unit) for routine progression of care       Report consisted of patients Situation, Background, Assessment and   Recommendations(SBAR). Information from the following report(s) SBAR, Kardex, ED Summary, [facility] and Recent Results was reviewed with the receiving nurse. Lines:       Opportunity for questions and clarification was provided.

## 2018-02-04 ENCOUNTER — APPOINTMENT (OUTPATIENT)
Dept: ULTRASOUND IMAGING | Age: 43
DRG: 871 | End: 2018-02-04
Attending: INTERNAL MEDICINE
Payer: MEDICARE

## 2018-02-04 LAB
ALBUMIN SERPL-MCNC: 2 G/DL (ref 3.5–5)
ALBUMIN/GLOB SERPL: 0.5 {RATIO} (ref 1.1–2.2)
ALP SERPL-CCNC: 150 U/L (ref 45–117)
ALT SERPL-CCNC: 40 U/L (ref 12–78)
ANION GAP SERPL CALC-SCNC: 6 MMOL/L (ref 5–15)
AST SERPL-CCNC: 116 U/L (ref 15–37)
BASOPHILS # BLD: 0.1 K/UL (ref 0–0.1)
BASOPHILS NFR BLD: 1 % (ref 0–1)
BILIRUB SERPL-MCNC: 2 MG/DL (ref 0.2–1)
BUN SERPL-MCNC: 30 MG/DL (ref 6–20)
BUN/CREAT SERPL: 30 (ref 12–20)
CALCIUM SERPL-MCNC: 7.5 MG/DL (ref 8.5–10.1)
CHLORIDE SERPL-SCNC: 115 MMOL/L (ref 97–108)
CO2 SERPL-SCNC: 23 MMOL/L (ref 21–32)
CREAT SERPL-MCNC: 1.01 MG/DL (ref 0.55–1.02)
CRP SERPL-MCNC: 5.88 MG/DL (ref 0–0.6)
DIFFERENTIAL METHOD BLD: ABNORMAL
EOSINOPHIL # BLD: 0 K/UL (ref 0–0.4)
EOSINOPHIL NFR BLD: 0 % (ref 0–7)
ERYTHROCYTE [DISTWIDTH] IN BLOOD BY AUTOMATED COUNT: 18.9 % (ref 11.5–14.5)
GLOBULIN SER CALC-MCNC: 4.4 G/DL (ref 2–4)
GLUCOSE SERPL-MCNC: 89 MG/DL (ref 65–100)
HCT VFR BLD AUTO: 20.2 % (ref 35–47)
HGB BLD-MCNC: 7 G/DL (ref 11.5–16)
IMM GRANULOCYTES # BLD: 0 K/UL (ref 0–0.04)
IMM GRANULOCYTES NFR BLD AUTO: 0 % (ref 0–0.5)
LYMPHOCYTES # BLD: 0.8 K/UL (ref 0.8–3.5)
LYMPHOCYTES NFR BLD: 6 % (ref 12–49)
MCH RBC QN AUTO: 25.3 PG (ref 26–34)
MCHC RBC AUTO-ENTMCNC: 34.7 G/DL (ref 30–36.5)
MCV RBC AUTO: 72.9 FL (ref 80–99)
MONOCYTES # BLD: 0.7 K/UL (ref 0–1)
MONOCYTES NFR BLD: 5 % (ref 5–13)
MYELOCYTES NFR BLD MANUAL: 1 %
NEUTS BAND NFR BLD MANUAL: 7 %
NEUTS SEG # BLD: 11.7 K/UL (ref 1.8–8)
NEUTS SEG NFR BLD: 80 % (ref 32–75)
NRBC # BLD: 0.13 K/UL (ref 0–0.01)
NRBC BLD-RTO: 1 PER 100 WBC
PLATELET # BLD AUTO: 358 K/UL (ref 150–400)
PMV BLD AUTO: 10.8 FL (ref 8.9–12.9)
POTASSIUM SERPL-SCNC: 4.1 MMOL/L (ref 3.5–5.1)
PROT SERPL-MCNC: 6.4 G/DL (ref 6.4–8.2)
RBC # BLD AUTO: 2.77 M/UL (ref 3.8–5.2)
RBC MORPH BLD: ABNORMAL
RETICS # AUTO: 0.1 M/UL (ref 0.02–0.08)
RETICS/RBC NFR AUTO: 3.6 % (ref 0.7–2.1)
SODIUM SERPL-SCNC: 144 MMOL/L (ref 136–145)
WBC # BLD AUTO: 13.4 K/UL (ref 3.6–11)

## 2018-02-04 PROCEDURE — 74011250636 HC RX REV CODE- 250/636: Performed by: INTERNAL MEDICINE

## 2018-02-04 PROCEDURE — 74011250637 HC RX REV CODE- 250/637: Performed by: INTERNAL MEDICINE

## 2018-02-04 PROCEDURE — 74011250636 HC RX REV CODE- 250/636: Performed by: EMERGENCY MEDICINE

## 2018-02-04 PROCEDURE — 85045 AUTOMATED RETICULOCYTE COUNT: CPT | Performed by: INTERNAL MEDICINE

## 2018-02-04 PROCEDURE — 74011250637 HC RX REV CODE- 250/637: Performed by: PSYCHIATRY & NEUROLOGY

## 2018-02-04 PROCEDURE — 36415 COLL VENOUS BLD VENIPUNCTURE: CPT | Performed by: INTERNAL MEDICINE

## 2018-02-04 PROCEDURE — 74011000250 HC RX REV CODE- 250: Performed by: INTERNAL MEDICINE

## 2018-02-04 PROCEDURE — 86140 C-REACTIVE PROTEIN: CPT | Performed by: INTERNAL MEDICINE

## 2018-02-04 PROCEDURE — 93971 EXTREMITY STUDY: CPT

## 2018-02-04 PROCEDURE — 85025 COMPLETE CBC W/AUTO DIFF WBC: CPT | Performed by: INTERNAL MEDICINE

## 2018-02-04 PROCEDURE — 65660000000 HC RM CCU STEPDOWN

## 2018-02-04 PROCEDURE — 80053 COMPREHEN METABOLIC PANEL: CPT | Performed by: INTERNAL MEDICINE

## 2018-02-04 RX ORDER — HYDROMORPHONE HYDROCHLORIDE 2 MG/ML
1 INJECTION, SOLUTION INTRAMUSCULAR; INTRAVENOUS; SUBCUTANEOUS
Status: DISCONTINUED | OUTPATIENT
Start: 2018-02-04 | End: 2018-02-08 | Stop reason: HOSPADM

## 2018-02-04 RX ORDER — MORPHINE SULFATE 30 MG/1
30 TABLET, FILM COATED, EXTENDED RELEASE ORAL EVERY 12 HOURS
Status: DISCONTINUED | OUTPATIENT
Start: 2018-02-04 | End: 2018-02-04

## 2018-02-04 RX ORDER — OXYCODONE HYDROCHLORIDE 5 MG/1
15 TABLET ORAL
Status: DISCONTINUED | OUTPATIENT
Start: 2018-02-04 | End: 2018-02-08 | Stop reason: HOSPADM

## 2018-02-04 RX ORDER — GABAPENTIN 300 MG/1
300 CAPSULE ORAL
Status: DISCONTINUED | OUTPATIENT
Start: 2018-02-04 | End: 2018-02-08 | Stop reason: HOSPADM

## 2018-02-04 RX ADMIN — Medication 10 ML: at 13:47

## 2018-02-04 RX ADMIN — METRONIDAZOLE 500 MG: 500 INJECTION, SOLUTION INTRAVENOUS at 11:51

## 2018-02-04 RX ADMIN — OXYCODONE HYDROCHLORIDE 15 MG: 5 TABLET ORAL at 18:01

## 2018-02-04 RX ADMIN — SODIUM CHLORIDE 750 MG: 900 INJECTION, SOLUTION INTRAVENOUS at 01:59

## 2018-02-04 RX ADMIN — AMIODARONE HYDROCHLORIDE 400 MG: 200 TABLET ORAL at 17:17

## 2018-02-04 RX ADMIN — Medication 10 ML: at 06:20

## 2018-02-04 RX ADMIN — SODIUM CHLORIDE 100 ML/HR: 900 INJECTION, SOLUTION INTRAVENOUS at 17:17

## 2018-02-04 RX ADMIN — LEVETIRACETAM 250 MG: 250 TABLET, FILM COATED ORAL at 09:12

## 2018-02-04 RX ADMIN — CEFEPIME 2 G: 2 INJECTION, POWDER, FOR SOLUTION INTRAMUSCULAR; INTRAVENOUS at 13:46

## 2018-02-04 RX ADMIN — AMIODARONE HYDROCHLORIDE 400 MG: 200 TABLET ORAL at 09:12

## 2018-02-04 RX ADMIN — SODIUM CHLORIDE 100 ML/HR: 900 INJECTION, SOLUTION INTRAVENOUS at 04:35

## 2018-02-04 RX ADMIN — CEFEPIME 2 G: 2 INJECTION, POWDER, FOR SOLUTION INTRAMUSCULAR; INTRAVENOUS at 02:54

## 2018-02-04 RX ADMIN — FOLIC ACID 1 MG: 1 TABLET ORAL at 09:12

## 2018-02-04 RX ADMIN — LEVETIRACETAM 250 MG: 250 TABLET, FILM COATED ORAL at 17:17

## 2018-02-04 RX ADMIN — OXYCODONE HYDROCHLORIDE 15 MG: 5 TABLET ORAL at 07:17

## 2018-02-04 RX ADMIN — HYDROMORPHONE HYDROCHLORIDE 1 MG: 2 INJECTION INTRAMUSCULAR; INTRAVENOUS; SUBCUTANEOUS at 06:01

## 2018-02-04 NOTE — PROGRESS NOTES
Hospitalist Progress Note    NAME: Toni Granger   :  1975   MRN:  [id number]       Assessment / Plan:  Sepsis due to left leg infection in setting of chronic LLE wound, present on admission:  Low blood pressure and hypothermia on presentation. WBC improving today. - CXR with mild pulmonary edema pattern, will decrease IVF  - wound cultures heavy pseudomonas, mod probable staph aureus, mod diphtheroids. Will follow for speciation. Blood cultures no growth. - weaning off levophed this morning  - con't emperic IV vancomycin, cefepime and flagyl for now  Possible seizures with h/o ICH and remote CVA:  None since admission, no history of seizure. Per family seizure lasted a few seconds, after Pt was post-ictal with right sided deficits and slurred speech per EMS. - CT head with no acute intracranial process. 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 - neuro consult appreciated  - EEG pending  - con't keppra with prn ativan for now  Afib with RVR and history of R atrial mass: converted to NSR shortly after admission  - con't oral amiodarone; no anticoagulation with h/o ICH  - appreciate cardiology consult  - will likely need echo to re-evaluate atrial mass while here or in close f/u  YEN:  Improving Cr, but not at baseline  - UA on admission benign  - con't IV fluids  - had urinary retention (350mL) on admission, will try to d/c garcia today  Sickle cell crisis with severe anemia:  - transfused 3u pRBCs this admission  - appreciate hematology consult  - holding home high-dose MS contin as Pt became extremely lethargic with lower blood pressure after dose last night. Will con't home oxycodone at half her usual dose. Abnormal LFTs, imroving: suspect due to sepsis and sickling, con't to monitor. Will send hepatitis panel, consider US abdomen if not improving   Lymphoma of the stomach:   Follows at [facility], plans for LN biopsy in the near future, but not currently on treatment  - will get doppler of RLE today due to severe edema, risk for DVT  Underweight (Body mass index is 17.16 kg/(m^2)     Code Status: Full    DVT Prophylaxis: Lovenox     Subjective:     Chief Complaint / Reason for Physician Visit  \"I'm getting better\". No specific complaints currently, has appropriate questions about medical issues being addressed. Discussed with RN events overnight. Review of Systems:  Symptom Y/N Comments  Symptom Y/N Comments   Fever/Chills n   Chest Pain n    Poor Appetite n   Edema y    Cough n   Abdominal Pain n    Sputum n   Joint Pain     SOB/DELGADO n   Pruritis/Rash     Nausea/vomit    Tolerating PT/OT     Diarrhea    Tolerating Diet     Constipation    Other       Could NOT obtain due to:      Objective:     VITALS:   Last 24hrs VS reviewed since prior progress note.  Most recent are:  Patient Vitals for the past 24 hrs:   Temp Pulse Resp BP SpO2   02/04/18 0730 - 89 - (!) 127/113 100 %   02/04/18 0719 97.6 °F (36.4 °C) 95 19 (!) 158/93 100 %   02/04/18 0630 - 86 - 147/85 98 %   02/04/18 0620 - 80 - 145/76 -   02/04/18 0530 - 75 - 125/70 100 %   02/04/18 0500 - 70 - 118/71 99 %   02/04/18 0430 - 71 - 108/63 -   02/04/18 0400 - 70 - 104/64 100 %   02/04/18 0330 - 68 - 96/55 100 %   02/04/18 0230 98.2 °F (36.8 °C) 73 18 102/58 100 %   02/04/18 0200 - 72 - 101/59 100 %   02/04/18 0130 - 67 - 97/58 100 %   02/04/18 0100 - 73 - 111/60 100 %   02/04/18 0030 - 69 - 92/63 100 %   02/04/18 0000 - 68 - 100/58 100 %   02/03/18 2330 - 67 - 102/59 100 %   02/03/18 2300 - 69 - 108/81 100 %   02/03/18 2230 97.8 °F (36.6 °C) 65 18 100/56 100 %   02/03/18 2200 - 63 - 96/58 100 %   02/03/18 2130 - 70 - 101/57 100 %   02/03/18 2115 - 65 - 100/59 100 %   02/03/18 2100 - 67 - 104/59 100 %   02/03/18 2045 - 71 - 108/68 100 %   02/03/18 2030 - 71 - 100/68 100 %   02/03/18 2015 - 72 - 114/72 100 %   02/03/18 2001 - 70 - 115/66 100 %   02/03/18 1945 - 60 - 99/59 100 %   02/03/18 1905 97.8 °F (36.6 °C) 66 18 113/65 100 %   02/03/18 1800 - 68 - (!) 83/49 100 %   02/03/18 1745 - 66 - 91/48 99 %   02/03/18 1730 - 66 - 95/54 99 %   02/03/18 1715 - 65 - 110/63 98 %   02/03/18 1713 - - - 96/59 -   02/03/18 1700 - 62 - 94/53 99 %   02/03/18 1645 97.4 °F (36.3 °C) 62 16 113/63 100 %   02/03/18 1630 - 65 - 128/67 100 %   02/03/18 1615 - 68 - 112/73 100 %   02/03/18 1600 - 69 - 114/68 100 %   02/03/18 1545 - (!) 59 - 127/71 100 %   02/03/18 1530 - 60 - 118/67 100 %   02/03/18 1515 - 65 - 126/74 100 %   02/03/18 1500 - 66 - 122/68 100 %   02/03/18 1445 - 64 - 116/76 100 %   02/03/18 1430 - 61 - 111/61 100 %   02/03/18 1424 97.3 °F (36.3 °C) 60 17 116/58 100 %   02/03/18 1415 - (!) 57 - 109/62 100 %   02/03/18 1400 - (!) 56 - 108/63 100 %   02/03/18 1345 - 60 - 120/61 100 %   02/03/18 1330 - 70 - 144/79 -   02/03/18 1315 - 76 - 125/80 -   02/03/18 1300 - 63 - 135/73 99 %   02/03/18 1245 - (!) 56 - 102/54 99 %   02/03/18 1230 - (!) 58 - 95/50 99 %   02/03/18 1215 - (!) 56 - 96/54 99 %   02/03/18 1200 - (!) 57 - 106/47 96 %   02/03/18 1145 - (!) 57 - 96/50 98 %   02/03/18 1130 - 60 - 95/49 100 %   02/03/18 1115 - 63 - 103/54 100 %   02/03/18 1100 - 64 - 109/58 100 %   02/03/18 1045 - 64 - 99/59 100 %   02/03/18 1015 - 63 - 100/62 99 %   02/03/18 0952 97.4 °F (36.3 °C) (!) 54 18 91/44 99 %   02/03/18 0945 - (!) 56 - (!) 87/48 99 %   02/03/18 0933 97.4 °F (36.3 °C) 60 17 99/48 100 %   02/03/18 0930 - 61 - 97/55 100 %   02/03/18 0915 - 60 - 98/53 99 %   02/03/18 0900 - (!) 58 - 100/58 100 %   02/03/18 0856 - (!) 56 - (!) 88/61 98 %   02/03/18 0845 - (!) 57 - 101/56 99 %   02/03/18 0838 - (!) 59 - 93/54 100 %   02/03/18 0830 97.4 °F (36.3 °C) 69 18 (!) 79/51 99 %   02/03/18 0815 - 61 - (!) 71/36 98 %       Intake/Output Summary (Last 24 hours) at 02/04/18 0810  Last data filed at 02/04/18 0259   Gross per 24 hour   Intake           836.75 ml   Output             1800 ml   Net          -963.25 ml        PHYSICAL EXAM:  General: WD, WN.  Alert, cooperative, no acute distress    EENT:  EOMI. Anicteric sclerae. MMM  Resp:  CTA bilaterally, no wheezing or rales. No accessory muscle use  CV:  Regular  rhythm,  3+ RLE edema  GI:  Soft, Non distended, Non tender.  +Bowel sounds  Neurologic:  Alert and oriented X 3, normal speech,   Psych:   Some insight. Not anxious nor agitated  Skin:  No rashes. No jaundice. Dressing c/d/i with minimal edema surrounding wound    Reviewed most current lab test results and cultures  YES  Reviewed most current radiology test results   YES  Review and summation of old records today    NO  Reviewed patient's current orders and MAR    YES  PMH/SH reviewed - no change compared to H&P  ________________________________________________________________________  Care Plan discussed with:    Comments   Patient x    Family      RN x    Care Manager     Consultant                        Multidiciplinary team rounds were held today with , nursing, pharmacist and clinical coordinator. Patient's plan of care was discussed; medications were reviewed and discharge planning was addressed. ________________________________________________________________________  Total NON critical care TIME:  25 Minutes    Total CRITICAL CARE TIME Spent:   Minutes non procedure based      Comments   >50% of visit spent in counseling and coordination of care x    ________________________________________________________________________  Rica Oppenheim, MD     Procedures: see electronic medical records for all procedures/Xrays and details which were not copied into this note but were reviewed prior to creation of Plan. LABS:  I reviewed today's most current labs and imaging studies.   Pertinent labs include:  Recent Labs      02/04/18   0240  02/03/18   1755  02/03/18   1308  02/03/18   0452  02/02/18   2205   WBC  13.4*   --    --   15.9*  20.5*   HGB  7.0*  6.9*  5.7*  5.2*  6.0*   HCT  20.2*  20.8*  17.1*  15.2*  17.8*   PLT  358   --    --   377  425*     Recent Labs      02/04/18   0240  02/03/18   0452  02/02/18   2205   NA  144  139  137   K  4.1  3.8  3.6   CL  115*  108  103   CO2  23  22  21   GLU  89  97  117*   BUN  30*  38*  40*   CREA  1.01  1.47*  2.04*   CA  7.5*  7.3*  8.2*   ALB  2.0*  2.1*  2.8*   TBILI  2.0*  2.2*  2.9*   SGOT  116*  130*  158*   ALT  40  41  43       Signed: Cori Alcantara MD

## 2018-02-04 NOTE — PROGRESS NOTES
2/4/2018 9:07 AM    Admit Date: 2/2/2018    Admit Diagnosis: Sepsis ([address]    Subjective:     Mira Jenkins remains very sleepy. Now off pressors. No other events overnight.      Visit Vitals    BP (!) 127/113    Pulse 89    Temp 97.6 °F (36.4 °C)    Resp 19    Ht 5' 4\" (1.626 m)    Wt 100 lb (45.4 kg)    SpO2 100%    BMI 17.16 kg/m2     Current Facility-Administered Medications   Medication Dose Route Frequency    HYDROmorphone (DILAUDID) injection 1 mg  1 mg IntraVENous Q6H PRN    morphine CR (MS CONTIN) tablet 30 mg  30 mg Oral Q12H    oxyCODONE IR (ROXICODONE) tablet 15 mg  15 mg Oral Q4H PRN    vancomycin (VANCOCIN) 750 mg in 0.9% sodium chloride (MBP/ADV) 250 mL  750 mg IntraVENous Q24H    0.9% sodium chloride infusion  100 mL/hr IntraVENous CONTINUOUS    cefepime (MAXIPIME) 2 g in 0.9 %  mL IVPB  2 g IntraVENous Q12H    levalbuterol (XOPENEX) nebulizer soln 1.25 mg/3 mL  1.25 mg Nebulization Q6H PRN    sodium chloride (NS) flush 5-10 mL  5-10 mL IntraVENous Q8H    sodium chloride (NS) flush 5-10 mL  5-10 mL IntraVENous PRN    acetaminophen (TYLENOL) tablet 650 mg  650 mg Oral Q6H PRN    ondansetron (ZOFRAN) injection 4 mg  4 mg IntraVENous Q4H PRN    LORazepam (ATIVAN) injection 1 mg  1 mg IntraVENous K1L PRN    folic acid (FOLVITE) tablet 1 mg  1 mg Oral DAILY    0.9% sodium chloride infusion 250 mL  250 mL IntraVENous PRN    NOREPINephrine (LEVOPHED) 8 mg in dextrose 5% 250 mL infusion  2-16 mcg/min IntraVENous TITRATE    saline peripheral flush soln 10 mL  10 mL InterCATHeter PRN    SALINE PERIPHERAL FLUSH Q8H soln 10 mL  10 mL InterCATHeter Q8H    metroNIDAZOLE (FLAGYL) IVPB premix 500 mg  500 mg IntraVENous Q12H    amiodarone (CORDARONE) tablet 400 mg  400 mg Oral BID    levETIRAcetam (KEPPRA) tablet 250 mg  250 mg Oral BID         Objective:      Visit Vitals    BP (!) 127/113    Pulse 89    Temp 97.6 °F (36.4 °C)    Resp 19    Ht 5' 4\" (1.626 m)  Wt 100 lb (45.4 kg)    SpO2 100%    BMI 17.16 kg/m2       Physical Exam:  Abdomen: soft, non-tender. Bowel sounds normal.   Extremities: no cyanosis or edema  Heart: regular rate and rhythm, S1, S2 normal, no murmur, click, rub or gallop  Lungs: clear to auscultation bilaterally  Neurologic: drowsy  Pulses: 2+     Data Review:   Labs:    Recent Results (from the past 24 hour(s))   HGB & HCT    Collection Time: 02/03/18  1:08 PM   Result Value Ref Range    HGB 5.7 (LL) 11.5 - 16.0 g/dL    HCT 17.1 (LL) 35.0 - 47.0 %   HGB & HCT    Collection Time: 02/03/18  5:55 PM   Result Value Ref Range    HGB 6.9 (L) 11.5 - 16.0 g/dL    HCT 20.8 (L) 35.0 - 98.5 %   METABOLIC PANEL, COMPREHENSIVE    Collection Time: 02/04/18  2:40 AM   Result Value Ref Range    Sodium 144 136 - 145 mmol/L    Potassium 4.1 3.5 - 5.1 mmol/L    Chloride 115 (H) 97 - 108 mmol/L    CO2 23 21 - 32 mmol/L    Anion gap 6 5 - 15 mmol/L    Glucose 89 65 - 100 mg/dL    BUN 30 (H) 6 - 20 MG/DL    Creatinine 1.01 0.55 - 1.02 MG/DL    BUN/Creatinine ratio 30 (H) 12 - 20      GFR est AA >60 >60 ml/min/1.73m2    GFR est non-AA 60 (L) >60 ml/min/1.73m2    Calcium 7.5 (L) 8.5 - 10.1 MG/DL    Bilirubin, total 2.0 (H) 0.2 - 1.0 MG/DL    ALT (SGPT) 40 12 - 78 U/L    AST (SGOT) 116 (H) 15 - 37 U/L    Alk.  phosphatase 150 (H) 45 - 117 U/L    Protein, total 6.4 6.4 - 8.2 g/dL    Albumin 2.0 (L) 3.5 - 5.0 g/dL    Globulin 4.4 (H) 2.0 - 4.0 g/dL    A-G Ratio 0.5 (L) 1.1 - 2.2     CBC WITH AUTOMATED DIFF    Collection Time: 02/04/18  2:40 AM   Result Value Ref Range    WBC 13.4 (H) 3.6 - 11.0 K/uL    RBC 2.77 (L) 3.80 - 5.20 M/uL    HGB 7.0 (L) 11.5 - 16.0 g/dL    HCT 20.2 (L) 35.0 - 47.0 %    MCV 72.9 (L) 80.0 - 99.0 FL    MCH 25.3 (L) 26.0 - 34.0 PG    MCHC 34.7 30.0 - 36.5 g/dL    RDW 18.9 (H) 11.5 - 14.5 %    PLATELET 104 804 - 964 K/uL    MPV 10.8 8.9 - 12.9 FL    NRBC 1.0 (H) 0  WBC    ABSOLUTE NRBC 0.13 (H) 0.00 - 0.01 K/uL    NEUTROPHILS 80 (H) 32 - 75 %    BAND NEUTROPHILS 7 %    LYMPHOCYTES 6 (L) 12 - 49 %    MONOCYTES 5 5 - 13 %    EOSINOPHILS 0 0 - 7 %    BASOPHILS 1 0 - 1 %    MYELOCYTES 1 %    IMMATURE GRANULOCYTES 0 0.0 - 0.5 %    ABS. NEUTROPHILS 11.7 (H) 1.8 - 8.0 K/UL    ABS. LYMPHOCYTES 0.8 0.8 - 3.5 K/UL    ABS. MONOCYTES 0.7 0.0 - 1.0 K/UL    ABS. EOSINOPHILS 0.0 0.0 - 0.4 K/UL    ABS. BASOPHILS 0.1 0.0 - 0.1 K/UL    ABS. IMM. GRANS. 0.0 0.00 - 0.04 K/UL    DF MANUAL      RBC COMMENTS ANISOCYTOSIS  1+        RBC COMMENTS MICROCYTOSIS  1+        RBC COMMENTS TARGET CELLS  1+        RBC COMMENTS POLYCHROMASIA  PRESENT       RETICULOCYTE COUNT    Collection Time: 02/04/18  2:40 AM   Result Value Ref Range    Reticulocyte count 3.6 (H) 0.7 - 2.1 %    Absolute Retic Cnt. 0.0983 (H) 0.0164 - 0.0776 M/ul   C REACTIVE PROTEIN, QT    Collection Time: 02/04/18  2:40 AM   Result Value Ref Range    C-Reactive protein 5.88 (H) 0.00 - 0.60 mg/dL       Telemetry: normal sinus rhythm      Assessment:     Active Problems:    Sickle cell crisis ([facility] (4/21/2015)      Sickle cell anemia (HCC) (4/21/2015)      Sepsis ([facility] [address] (4/21/2015)      Leg wound, left (2/3/2018)      Abnormal LFTs (2/3/2018)      A-fib ([facility] [address] (2/3/2018)      Multiple myeloma ([facility] [address] (2/3/2018)      Seizures ([address] (2/3/2018)        Plan:     1. A. Fib with RVR: remais in sinus rhythm. On PO amio. Echo pending. No AC due to h/o ICH. Off pressors. After last evaluation she never followed up for cardiac MRI to assess for right atrial mass. Will address it once acute issues resolved. 2. Sickle cell, anemia: getting BT. Hematology consulted. 3. Renal insuff: IV hydration. Cr improving. 4. Sepsis: per hospitalist. On abx.

## 2018-02-04 NOTE — PROGRESS NOTES
NEUROLOGY NOTE       Chief Complaint   Patient presents with    Seizure     EMS reports family noting several seconds of seizure activity. SUBJECTIVE:  No seizures since admission  EEG is pending    HISTORY OF PRESENT ILLNESS  Toni Granger is a 36 y.o. female who presents to the hospital because of a seizure. Pt remembers going to the bathroom and putting on her gown and felt as if her face was twisting and her arms were jerking. She does not remember what happened next. According to ER notes: Per EMS they were called by  while the pt was seizing. They state that seizure lasted a few seconds before resolving. After the seizure the pt was post ictal with right sided deficits and slurred speech per EMS. 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 No hx of seizures in the past. Stroke was around 4 yrs ago.      ROS  A ten system review of constitutional, cardiovascular, respiratory, musculoskeletal, endocrine, skin, SHEENT, genitourinary, psychiatric and neurologic systems was obtained and is unremarkable except as stated in HPI     PMH  Past Medical History:   Diagnosis Date    A-fib [facility]     Arthritis     Chronic ulcer of left leg ([address]     H/O tracheostomy     decannulated    Heart murmur     Hemorrhagic stroke ([address] 1/2015    Ill-defined condition     sickle cell    Intracranial hemorrhage ([address] 1/2015    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    PEG (percutaneous endoscopic gastrostomy) status ([facility] [facility]     removed    Right atrial thrombus     Right sided weakness     from hemorrhagic stroke    Sickle cell disease ([facility] [address] [facility]        FH  Family History   Problem Relation Age of Onset    Hypertension Other        SH  Social History     Social History    Marital status: SINGLE     Spouse name: N/A    Number of children: N/A    Years of education: N/A     Social History Main Topics    Smoking status: Never Smoker    Smokeless tobacco: Never Used    Alcohol use No    Drug use: No    Sexual activity: Not Asked     Other Topics Concern    None     Social History Narrative       ALLERGIES  Allergies   Allergen Reactions    Pcn [Penicillins] Hives    Silvadene [Silver Sulfadiazine] Hives    Zantac [Ranitidine Hcl] Itching       PHYSICAL EXAM  EXAMINATION:   Patient Vitals for the past 24 hrs:   Temp Pulse Resp BP SpO2   02/04/18 1130 97.3 °F (36.3 °C) 65 17 95/61 100 %   02/04/18 1100 - 70 - 108/61 100 %   02/04/18 1030 - 70 - 113/71 -   02/04/18 0930 - - - 121/78 -   02/04/18 0900 - 66 - 108/68 100 %   02/04/18 0830 - 74 - 130/78 100 %   02/04/18 0800 - 83 - 140/77 100 %   02/04/18 0730 - 89 - (!) 127/113 100 %   02/04/18 0719 97.6 °F (36.4 °C) 95 19 (!) 158/93 100 %   02/04/18 0630 - 86 - 147/85 98 %   02/04/18 0620 - 80 - 145/76 -   02/04/18 0530 - 75 - 125/70 100 %   02/04/18 0500 - 70 - 118/71 99 %   02/04/18 0430 - 71 - 108/63 -   02/04/18 0400 - 70 - 104/64 100 %   02/04/18 0330 - 68 - 96/55 100 %   02/04/18 0230 98.2 °F (36.8 °C) 73 18 102/58 100 %   02/04/18 0200 - 72 - 101/59 100 %   02/04/18 0130 - 67 - 97/58 100 %   02/04/18 0100 - 73 - 111/60 100 %   02/04/18 0030 - 69 - 92/63 100 %   02/04/18 0000 - 68 - 100/58 100 %   02/03/18 2330 - 67 - 102/59 100 %   02/03/18 2300 - 69 - 108/81 100 %   02/03/18 2230 97.8 °F (36.6 °C) 65 18 100/56 100 %   02/03/18 2200 - 63 - 96/58 100 %   02/03/18 2130 - 70 - 101/57 100 %   02/03/18 2115 - 65 - 100/59 100 %   02/03/18 2100 - 67 - 104/59 100 %   02/03/18 2045 - 71 - 108/68 100 %   02/03/18 2030 - 71 - 100/68 100 %   02/03/18 2015 - 72 - 114/72 100 %   02/03/18 2001 - 70 - 115/66 100 %   02/03/18 1945 - 60 - 99/59 100 %   02/03/18 1905 97.8 °F (36.6 °C) 66 18 113/65 100 %   02/03/18 1800 - 68 - (!) 83/49 100 %   02/03/18 1745 - 66 - 91/48 99 %   02/03/18 1730 - 66 - 95/54 99 %   02/03/18 1715 - 65 - 110/63 98 %   02/03/18 1713 - - - 96/59 -   02/03/18 1700 - 62 - 94/53 99 %   02/03/18 1645 97.4 °F (36.3 °C) 62 16 113/63 100 %   02/03/18 1630 - 65 - 128/67 100 %   02/03/18 1615 - 68 - 112/73 100 %   02/03/18 1600 - 69 - 114/68 100 %   02/03/18 1545 - (!) 59 - 127/71 100 %   02/03/18 1530 - 60 - 118/67 100 %   02/03/18 1515 - 65 - 126/74 100 %   02/03/18 1500 - 66 - 122/68 100 %   02/03/18 1445 - 64 - 116/76 100 %   02/03/18 1430 - 61 - 111/61 100 %   02/03/18 1424 97.3 °F (36.3 °C) 60 17 116/58 100 %   02/03/18 1415 - (!) 57 - 109/62 100 %   02/03/18 1400 - (!) 56 - 108/63 100 %   02/03/18 1345 - 60 - 120/61 100 %   02/03/18 1330 - 70 - 144/79 -   02/03/18 1315 - 76 - 125/80 -   02/03/18 1300 - 63 - 135/73 99 %   02/03/18 1245 - (!) 56 - 102/54 99 %   02/03/18 1230 - (!) 58 - 95/50 99 %   02/03/18 1215 - (!) 56 - 96/54 99 %        General:   General appearance: Pt is in no acute distress   Distal pulses are preserved    Neurological Examination:   Mental Status:  AAO x3. Speech is fluent. Follows commands, has normal fund of knowledge, attention, short term recall, comprehension and insight. Cranial Nerves: Visual fields are full. PERRL, Extraocular movements are full. Facial sensation intact. Facial movement intact. Hearing intact to conversation. Palate elevates symmetrically. Shoulder shrug symmetric. Tongue midline. Motor: Strength is 5/5 in all 4 ext. Normal tone. No atrophy. Sensation: Normal to light touch    Coordination/Cerebellar: Intact to finger-nose-finger     Skin: No significant bruising or lacerations.     LAB DATA REVIEWED:    Recent Results (from the past 24 hour(s))   HGB & HCT    Collection Time: 02/03/18  1:08 PM   Result Value Ref Range    HGB 5.7 (LL) 11.5 - 16.0 g/dL    HCT 17.1 (LL) 35.0 - 47.0 %   HGB & HCT    Collection Time: 02/03/18  5:55 PM   Result Value Ref Range    HGB 6.9 (L) 11.5 - 16.0 g/dL    HCT 20.8 (L) 35.0 - 82.4 %   METABOLIC PANEL, COMPREHENSIVE    Collection Time: 02/04/18  2:40 AM   Result Value Ref Range    Sodium 144 136 - 145 mmol/L    Potassium 4.1 3.5 - 5.1 mmol/L    Chloride 115 (H) 97 - 108 mmol/L    CO2 23 21 - 32 mmol/L    Anion gap 6 5 - 15 mmol/L    Glucose 89 65 - 100 mg/dL    BUN 30 (H) 6 - 20 MG/DL    Creatinine 1.01 0.55 - 1.02 MG/DL    BUN/Creatinine ratio 30 (H) 12 - 20      GFR est AA >60 >60 ml/min/1.73m2    GFR est non-AA 60 (L) >60 ml/min/1.73m2    Calcium 7.5 (L) 8.5 - 10.1 MG/DL    Bilirubin, total 2.0 (H) 0.2 - 1.0 MG/DL    ALT (SGPT) 40 12 - 78 U/L    AST (SGOT) 116 (H) 15 - 37 U/L    Alk. phosphatase 150 (H) 45 - 117 U/L    Protein, total 6.4 6.4 - 8.2 g/dL    Albumin 2.0 (L) 3.5 - 5.0 g/dL    Globulin 4.4 (H) 2.0 - 4.0 g/dL    A-G Ratio 0.5 (L) 1.1 - 2.2     CBC WITH AUTOMATED DIFF    Collection Time: 02/04/18  2:40 AM   Result Value Ref Range    WBC 13.4 (H) 3.6 - 11.0 K/uL    RBC 2.77 (L) 3.80 - 5.20 M/uL    HGB 7.0 (L) 11.5 - 16.0 g/dL    HCT 20.2 (L) 35.0 - 47.0 %    MCV 72.9 (L) 80.0 - 99.0 FL    MCH 25.3 (L) 26.0 - 34.0 PG    MCHC 34.7 30.0 - 36.5 g/dL    RDW 18.9 (H) 11.5 - 14.5 %    PLATELET 293 192 - 861 K/uL    MPV 10.8 8.9 - 12.9 FL    NRBC 1.0 (H) 0  WBC    ABSOLUTE NRBC 0.13 (H) 0.00 - 0.01 K/uL    NEUTROPHILS 80 (H) 32 - 75 %    BAND NEUTROPHILS 7 %    LYMPHOCYTES 6 (L) 12 - 49 %    MONOCYTES 5 5 - 13 %    EOSINOPHILS 0 0 - 7 %    BASOPHILS 1 0 - 1 %    MYELOCYTES 1 %    IMMATURE GRANULOCYTES 0 0.0 - 0.5 %    ABS. NEUTROPHILS 11.7 (H) 1.8 - 8.0 K/UL    ABS. LYMPHOCYTES 0.8 0.8 - 3.5 K/UL    ABS. MONOCYTES 0.7 0.0 - 1.0 K/UL    ABS. EOSINOPHILS 0.0 0.0 - 0.4 K/UL    ABS. BASOPHILS 0.1 0.0 - 0.1 K/UL    ABS. IMM.  GRANS. 0.0 0.00 - 0.04 K/UL    DF MANUAL      RBC COMMENTS ANISOCYTOSIS  1+        RBC COMMENTS MICROCYTOSIS  1+        RBC COMMENTS TARGET CELLS  1+        RBC COMMENTS POLYCHROMASIA  PRESENT       RETICULOCYTE COUNT    Collection Time: 02/04/18  2:40 AM   Result Value Ref Range    Reticulocyte count 3.6 (H) 0.7 - 2.1 %    Absolute Retic Cnt. 0.0983 (H) 0.0164 - 0.0776 M/ul C REACTIVE PROTEIN, QT    Collection Time: 02/04/18  2:40 AM   Result Value Ref Range    C-Reactive protein 5.88 (H) 0.00 - 0.60 mg/dL        Imaging review:  CT Head  No acute intracranial process. 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 HOME MEDS  Prior to Admission Medications   Prescriptions Last Dose Informant Patient Reported? Taking? morphine CR (MS CONTIN) 100 mg CR tablet 2/2/2018 at Unknown time  Yes Yes   Sig: Take 100 mg by mouth every twelve (12) hours. oxyCODONE IR (ROXICODONE) 30 mg immediate release tablet 2/2/2018 at Unknown time  Yes Yes   Sig: Take 30 mg by mouth every four (4) hours as needed for Pain. Facility-Administered Medications: None       CURRENT MEDS  Current Facility-Administered Medications   Medication Dose Route Frequency    morphine CR (MS CONTIN) tablet 30 mg  30 mg Oral Q12H    vancomycin (VANCOCIN) 750 mg in 0.9% sodium chloride (MBP/ADV) 250 mL  750 mg IntraVENous Q24H    0.9% sodium chloride infusion  100 mL/hr IntraVENous CONTINUOUS    cefepime (MAXIPIME) 2 g in 0.9 %  mL IVPB  2 g IntraVENous Q12H    sodium chloride (NS) flush 5-10 mL  5-10 mL IntraVENous M6O    folic acid (FOLVITE) tablet 1 mg  1 mg Oral DAILY    NOREPINephrine (LEVOPHED) 8 mg in dextrose 5% 250 mL infusion  2-16 mcg/min IntraVENous TITRATE    SALINE PERIPHERAL FLUSH Q8H soln 10 mL  10 mL InterCATHeter Q8H    metroNIDAZOLE (FLAGYL) IVPB premix 500 mg  500 mg IntraVENous Q12H    amiodarone (CORDARONE) tablet 400 mg  400 mg Oral BID    levETIRAcetam (KEPPRA) tablet 250 mg  250 mg Oral BID       IMPRESSION:  Jailene Martínez is a 36 y.o. female who presents with a seizures. Pt did have a seizure and does have hx of stroke and hence there is a high risk of recurrence. No seizures since admission. Will continue keppra 250 mg po bid. EEG is pending. RECOMMENDATIONS:  1. EEG  2. Keppra 250 mg po bid.      Madelyn Pappas MD  Neurologist

## 2018-02-04 NOTE — PROGRESS NOTES
PCU SHIFT NURSING NOTE      Bedside shift change report given to [employer] RN (oncoming nurse) by Mely Grimm RN (offgoing nurse). Report included the following information SBAR, Kardex, Intake/Output, Recent Results and Cardiac Rhythm NSR/Sinus Rosa Langston. Shift Summary:   1930: Assessment complete. Patient alert, lethargic, drowsy. Opens eyes to voice and stimuli. Per day shift nurse MD is aware of patients condition. Patient answering questions appropriately. No complaints of pain or discomfort. Levo drip currently running at 2mcg/min. BP tolerating. VSS. Will monitor  2355: Tylenol administered for 7/10 generalized pain. Will monitor   0023: MD paged. Orders received for Dilaudid 1mg Q4hrs PRN during wound care   7294: Patient assisted to bedside commode voicing pain at this time. Patient stating nurse has not been medicating her. Informed patient nurse has rounded on the patient every 35-40 min due to her lethargy. Patient has been resting comfortably throughout the night and only voiced issues with pain around 0000 which nurse administered tylenol. Patient wound care provided to left leg. Dilaudid administered at this time. 0011: MD paged. Orders received  0730: Report given to [facility]. Call Alok Hernandez in reach       Admission Date 2/2/2018   Admission Diagnosis Sepsis ([facility] [address]   Consults IP CONSULT TO NEUROLOGY  IP CONSULT TO HEMATOLOGY  IP CONSULT TO CARDIOLOGY        Consults   []PT   []OT   []Speech   []Case Management      [] Palliative      Cardiac Monitoring Order   []Yes   []No     IV drips   []Yes    Drip:                            Dose:  Drip:                            Dose:  Drip:                            Dose:   []No     GI Prophylaxis   []Yes   []No         DVT Prophylaxis   SCDs:             Nico stockings:         [] Medication   []Contraindicated   []None      Activity Level Activity Level: Bed Rest     Activity Assistance: Partial (one person)   Purposeful Rounding every 1-2 hour?    []Yes   Chikis Rodriguez Score  Total Score: 2   Bed Alarm (If score 3 or >)   []Yes   [] Refused (See signed refusal form in chart)   Shashi Score  Shashi Score: 14   Shashi Score (if score 14 or less)   []PMT consult   []Wound Care consult      []Specialty bed   [] Nutrition consult          Needs prior to discharge:   Home O2 required:    []Yes   []No    If yes, how much O2 required? Other:    Last Bowel Movement: Last Bowel Movement Date: 02/02/18      Influenza Vaccine Received Flu Vaccine for Current Season (usually Sept-March): Yes        Pneumonia Vaccine           Diet Active Orders   Diet    DIET REGULAR      LDAs             Venous Access Device Porticath 02/02/18 Upper chest (subclavicular area, right (Active)   Central Line Being Utilized Yes 2/3/2018  2:24 PM   Criteria for Appropriate Use Limited/no vessel suitable for conventional peripheral access 2/3/2018  2:24 PM   Site Assessment Clean, dry, & intact 2/3/2018  2:24 PM   Date of Last Dressing Change 02/02/18 2/3/2018  4:33 AM   Dressing Status Clean, dry, & intact 2/3/2018  2:24 PM   Dressing Type Disk with Chlorhexadine gluconate (CHG); Tape;Transparent 2/3/2018  2:24 PM   Action Taken Open ports on tubing capped 2/3/2018  2:24 PM   Alcohol Cap Used Yes 2/3/2018  2:24 PM      Peripheral IV 02/02/18 Left Arm (Active)   Site Assessment Clean, dry, & intact 2/3/2018  2:24 PM   Phlebitis Assessment 0 2/3/2018  2:24 PM   Infiltration Assessment 0 2/3/2018  2:24 PM   Dressing Status Clean, dry, & intact 2/3/2018  2:24 PM   Dressing Type Tape;Transparent 2/3/2018  2:24 PM   Hub Color/Line Status Pink; Infusing;Patent 2/3/2018  2:24 PM   Action Taken Open ports on tubing capped 2/3/2018  2:24 PM   Alcohol Cap Used Yes 2/3/2018  2:24 PM                      Urinary Catheter Urinary Catheter 02/03/18 Martinez-Criteria for Appropriate Use: Medically/surgically unstable, Strict I/Os    Intake & Output   Date 02/02/18 1900 - 02/03/18 0659 02/03/18 0700 - 02/04/18 0659   Shift 1397-0972 24 Hour Total 4999-3799 2050-2728 24 Hour Total   I  N  T  A  K  E   I.V.  (mL/kg/hr)   794.3  (1.5)  794.3      Amiodarone Volume   40  40      Levophed Volume   4.3  4.3      Volume (0.9% sodium chloride infusion)   250  250      Volume (sodium chloride 0.9 % bolus infusion 500 mL)   500  500    Blood   582.5  582.5      Volume (TRANSFUSE PACKED RBC'S)   292.5  292.5      Volume (TRANSFUSE PACKED RBC'S)   290  290    Shift Total  (mL/kg)   1376.8  (30.4)  1376.8  (30.4)   O  U  T  P  U  T   Urine  (mL/kg/hr)   1300  (2.4)  1300      Urine Output (mL) (Urinary Catheter 02/03/18 Martinez)   1300  1300    Shift Total  (mL/kg)   1300  (28.7)  1300  (28.7)   NET   76.8  76.8   Weight (kg) 45.4 45.4 45.4 45.4 45.4         Readmission Risk Assessment Tool Score Low Risk            12       Total Score        3 Has Seen PCP in Last 6 Months (Yes=3, No=0)    9 Pt. Coverage (Medicare=5 , Medicaid, or Self-Pay=4)        Criteria that do not apply:    . Living with Significant Other. Assisted Living. LTAC. SNF.  or   Rehab    Patient Length of Stay (>5 days = 3)    IP Visits Last 12 Months (1-3=4, 4=9, >4=11)    Charlson Comorbidity Score (Age + Comorbid Conditions)       Expected Length of Stay - - -   Actual Length of Stay 0

## 2018-02-05 LAB
ALBUMIN SERPL-MCNC: 1.9 G/DL (ref 3.5–5)
ALBUMIN/GLOB SERPL: 0.4 {RATIO} (ref 1.1–2.2)
ALP SERPL-CCNC: 152 U/L (ref 45–117)
ALT SERPL-CCNC: 32 U/L (ref 12–78)
ANION GAP SERPL CALC-SCNC: 9 MMOL/L (ref 5–15)
AST SERPL-CCNC: 87 U/L (ref 15–37)
BACTERIA SPEC CULT: ABNORMAL
BASOPHILS # BLD: 0.1 K/UL (ref 0–0.1)
BASOPHILS NFR BLD: 2 % (ref 0–1)
BILIRUB SERPL-MCNC: 1.5 MG/DL (ref 0.2–1)
BUN SERPL-MCNC: 23 MG/DL (ref 6–20)
BUN/CREAT SERPL: 33 (ref 12–20)
CALCIUM SERPL-MCNC: 7.7 MG/DL (ref 8.5–10.1)
CHLORIDE SERPL-SCNC: 117 MMOL/L (ref 97–108)
CO2 SERPL-SCNC: 21 MMOL/L (ref 21–32)
CREAT SERPL-MCNC: 0.7 MG/DL (ref 0.55–1.02)
CRP SERPL-MCNC: 4.01 MG/DL (ref 0–0.6)
DIFFERENTIAL METHOD BLD: ABNORMAL
EOSINOPHIL # BLD: 0.2 K/UL (ref 0–0.4)
EOSINOPHIL NFR BLD: 3 % (ref 0–7)
ERYTHROCYTE [DISTWIDTH] IN BLOOD BY AUTOMATED COUNT: 19 % (ref 11.5–14.5)
GLOBULIN SER CALC-MCNC: 4.3 G/DL (ref 2–4)
GLUCOSE SERPL-MCNC: 79 MG/DL (ref 65–100)
GRAM STN SPEC: ABNORMAL
HCT VFR BLD AUTO: 19 % (ref 35–47)
HGB BLD-MCNC: 6.4 G/DL (ref 11.5–16)
IMM GRANULOCYTES # BLD: 0 K/UL (ref 0–0.04)
IMM GRANULOCYTES NFR BLD AUTO: 0 % (ref 0–0.5)
LYMPHOCYTES # BLD: 2.6 K/UL (ref 0.8–3.5)
LYMPHOCYTES NFR BLD: 36 % (ref 12–49)
MCH RBC QN AUTO: 25.1 PG (ref 26–34)
MCHC RBC AUTO-ENTMCNC: 33.7 G/DL (ref 30–36.5)
MCV RBC AUTO: 74.5 FL (ref 80–99)
METAMYELOCYTES NFR BLD MANUAL: 1 %
MONOCYTES # BLD: 0.4 K/UL (ref 0–1)
MONOCYTES NFR BLD: 6 % (ref 5–13)
MYELOCYTES NFR BLD MANUAL: 1 %
NEUTS BAND NFR BLD MANUAL: 1 %
NEUTS SEG # BLD: 3.7 K/UL (ref 1.8–8)
NEUTS SEG NFR BLD: 50 % (ref 32–75)
NRBC # BLD: 0.27 K/UL (ref 0–0.01)
NRBC BLD-RTO: 3.7 PER 100 WBC
PLATELET # BLD AUTO: 335 K/UL (ref 150–400)
PLATELET COMMENTS,PCOM: ABNORMAL
PMV BLD AUTO: 10.8 FL (ref 8.9–12.9)
POTASSIUM SERPL-SCNC: 4.1 MMOL/L (ref 3.5–5.1)
PROT SERPL-MCNC: 6.2 G/DL (ref 6.4–8.2)
RBC # BLD AUTO: 2.55 M/UL (ref 3.8–5.2)
RBC MORPH BLD: ABNORMAL
SERVICE CMNT-IMP: ABNORMAL
SODIUM SERPL-SCNC: 147 MMOL/L (ref 136–145)
WBC # BLD AUTO: 7.2 K/UL (ref 3.6–11)

## 2018-02-05 PROCEDURE — 86140 C-REACTIVE PROTEIN: CPT | Performed by: INTERNAL MEDICINE

## 2018-02-05 PROCEDURE — 74011250636 HC RX REV CODE- 250/636: Performed by: INTERNAL MEDICINE

## 2018-02-05 PROCEDURE — 80053 COMPREHEN METABOLIC PANEL: CPT | Performed by: INTERNAL MEDICINE

## 2018-02-05 PROCEDURE — 74011250637 HC RX REV CODE- 250/637: Performed by: INTERNAL MEDICINE

## 2018-02-05 PROCEDURE — 85025 COMPLETE CBC W/AUTO DIFF WBC: CPT | Performed by: INTERNAL MEDICINE

## 2018-02-05 PROCEDURE — 74011000250 HC RX REV CODE- 250: Performed by: INTERNAL MEDICINE

## 2018-02-05 PROCEDURE — 36415 COLL VENOUS BLD VENIPUNCTURE: CPT | Performed by: INTERNAL MEDICINE

## 2018-02-05 PROCEDURE — 74011250636 HC RX REV CODE- 250/636: Performed by: EMERGENCY MEDICINE

## 2018-02-05 PROCEDURE — 65660000000 HC RM CCU STEPDOWN

## 2018-02-05 PROCEDURE — 74011250637 HC RX REV CODE- 250/637: Performed by: PSYCHIATRY & NEUROLOGY

## 2018-02-05 PROCEDURE — 77030037878 HC DRSG MEPILEX >48IN BORD MOLN -B

## 2018-02-05 RX ORDER — SULFAMETHOXAZOLE AND TRIMETHOPRIM 800; 160 MG/1; MG/1
1 TABLET ORAL EVERY 12 HOURS
Status: DISCONTINUED | OUTPATIENT
Start: 2018-02-05 | End: 2018-02-07

## 2018-02-05 RX ORDER — CIPROFLOXACIN 500 MG/1
500 TABLET ORAL EVERY 12 HOURS
Status: DISCONTINUED | OUTPATIENT
Start: 2018-02-05 | End: 2018-02-08 | Stop reason: HOSPADM

## 2018-02-05 RX ADMIN — FOLIC ACID 1 MG: 1 TABLET ORAL at 09:30

## 2018-02-05 RX ADMIN — OXYCODONE HYDROCHLORIDE 15 MG: 5 TABLET ORAL at 21:47

## 2018-02-05 RX ADMIN — Medication 10 ML: at 16:12

## 2018-02-05 RX ADMIN — VANCOMYCIN HYDROCHLORIDE 1000 MG: 1 INJECTION, POWDER, LYOPHILIZED, FOR SOLUTION INTRAVENOUS at 12:43

## 2018-02-05 RX ADMIN — OXYCODONE HYDROCHLORIDE 15 MG: 5 TABLET ORAL at 16:11

## 2018-02-05 RX ADMIN — CIPROFLOXACIN HYDROCHLORIDE 500 MG: 500 TABLET, FILM COATED ORAL at 21:47

## 2018-02-05 RX ADMIN — METRONIDAZOLE 500 MG: 500 INJECTION, SOLUTION INTRAVENOUS at 11:31

## 2018-02-05 RX ADMIN — AMIODARONE HYDROCHLORIDE 400 MG: 200 TABLET ORAL at 17:53

## 2018-02-05 RX ADMIN — OXYCODONE HYDROCHLORIDE 15 MG: 5 TABLET ORAL at 00:30

## 2018-02-05 RX ADMIN — Medication 10 ML: at 00:31

## 2018-02-05 RX ADMIN — Medication 10 ML: at 05:04

## 2018-02-05 RX ADMIN — Medication 10 ML: at 21:48

## 2018-02-05 RX ADMIN — SODIUM CHLORIDE 100 ML/HR: 900 INJECTION, SOLUTION INTRAVENOUS at 02:33

## 2018-02-05 RX ADMIN — GABAPENTIN 300 MG: 300 CAPSULE ORAL at 00:30

## 2018-02-05 RX ADMIN — SODIUM CHLORIDE 750 MG: 900 INJECTION, SOLUTION INTRAVENOUS at 02:34

## 2018-02-05 RX ADMIN — SULFAMETHOXAZOLE AND TRIMETHOPRIM 1 TABLET: 800; 160 TABLET ORAL at 21:47

## 2018-02-05 RX ADMIN — AMIODARONE HYDROCHLORIDE 400 MG: 200 TABLET ORAL at 09:30

## 2018-02-05 RX ADMIN — OXYCODONE HYDROCHLORIDE 15 MG: 5 TABLET ORAL at 06:45

## 2018-02-05 RX ADMIN — ONDANSETRON HYDROCHLORIDE 4 MG: 2 INJECTION, SOLUTION INTRAMUSCULAR; INTRAVENOUS at 10:09

## 2018-02-05 RX ADMIN — GABAPENTIN 300 MG: 300 CAPSULE ORAL at 21:47

## 2018-02-05 RX ADMIN — CEFEPIME 2 G: 2 INJECTION, POWDER, FOR SOLUTION INTRAMUSCULAR; INTRAVENOUS at 04:45

## 2018-02-05 RX ADMIN — METRONIDAZOLE 500 MG: 500 INJECTION, SOLUTION INTRAVENOUS at 01:20

## 2018-02-05 RX ADMIN — RIVAROXABAN 20 MG: 20 TABLET, FILM COATED ORAL at 09:30

## 2018-02-05 RX ADMIN — LEVETIRACETAM 250 MG: 250 TABLET, FILM COATED ORAL at 17:53

## 2018-02-05 RX ADMIN — LEVETIRACETAM 250 MG: 250 TABLET, FILM COATED ORAL at 09:30

## 2018-02-05 NOTE — PROGRESS NOTES
MRI PENDING    Mri screening sheet needs to be completed and signed  Call the [phone] when this is done

## 2018-02-05 NOTE — PROGRESS NOTES
Bedside shift change report given to [facility] ,RN  (oncoming nurse) by Keegan George RN (offgoing nurse). Report included the following information SBAR, Kardex, Procedure Summary, Intake/Output, MAR, Accordion, Recent Results, Med Rec Status, Cardiac Rhythm SB/SR and Alarm Parameters .

## 2018-02-05 NOTE — WOUND CARE
Wound Care consult: Chart reviewed and patient assessed for her left leg wound that was present on admission. Pt. Awakened easily as I approached her bedside and she answered all questions needed for assessment. She has Sickle Cell Anemia and lymphoma and was admitted for a seizure. She has a chronic wound on the left leg that she does wound care to daily. This is a venous stasis ulcer. The wound bed is mostly pink with some thin slough. The edges are rolled / rounded and the wound is almost circumferential on the lower left leg. The surrounding skin is intact without excess edema or redness. Pt. Moves the leg well and her pulses are intact. WOUND POA CONDITIONS    Wound Leg Lower Left (Active)   DRESSING STATUS Removed 2/5/2018  5:48 PM   DRESSING TYPE Xeroform;Gauze;Gauze wrap (miller) 2/5/2018  5:48 PM   Non-Pressure Injury Full thickness (subcut/muscle) 2/5/2018  5:48 PM   Wound Length (cm) 7 cm 2/5/2018  5:48 PM   Wound Width (cm) 7 cm 2/5/2018  5:48 PM   Wound Depth (cm) 0.3 2/5/2018  5:48 PM   Wound Surface area (cm^2) 49 cm^2 2/5/2018  5:48 PM   Condition of Base Pink;Slough 2/5/2018  5:48 PM   Condition of Edges Rolled/curled 2/5/2018  5:48 PM   Epithelialization (%) 0 2/5/2018  5:48 PM   Tissue Type Pink;Yellow 2/5/2018  5:48 PM   Tissue Type Percent Pink 80 2/5/2018  5:48 PM   Tissue Type Percent Yellow 20 2/5/2018  5:48 PM   Drainage Amount  Small  2/5/2018  5:48 PM   Drainage Color Serosanguinous 2/5/2018  5:48 PM   Wound Odor None 2/5/2018  5:48 PM   Periwound Skin Condition Intact 2/5/2018  5:48 PM   Cleansing and Cleansing Agents  Normal saline 2/5/2018  5:48 PM   Dressing Type Applied Xeroform; Foam 2/5/2018  5:48 PM   Procedure Tolerated Fair 2/5/2018  5:48 PM       Wound care today and recommendation: Cleanse the left lower leg wound with NS, wiping the wound bed with 4x4's to remove the old drainage. Apply a Xeroform dressing and cover with an ABD and wrap with Miller.    Obdulia Laboy RN, BSN, CWON

## 2018-02-05 NOTE — CONSULTS
Hematology / Oncology progress note    Reason for Visit:   Johanna Oliveira is a 36 y.o. female who is seen in consultation at the request of Dr. Sarai Aguilera for evaluation of sickle cell anemia. History of Present Illness:   Ms. Gracia Nix is a 37 y/o female with sickle cell anemia, lymphoma of the stomach and A-fib who was admitted after a seizure. She was treated with Ativan. She was also found to be in Afib with RVR. The patient tells me that she suddenly felt her legs go weak and then ended up in the ER. She states she was having some low grade fevers at home for the past 3 days as well as n/v and diarrhea alternating with constipation. She states she is followed by Dr. Destinee Espinoza at [facility] for a cancer. Although patient is unsure of the type, her problem list here include lymphoma of the stomach. The patient states she has never received treatment for this, was in remission for a while, and then started having progression of lymph nodes which has led to swelling of her R thigh. She states she saw Dr. Hernando Carrizales and her NP at [facility] a few weeks ago with plan to set up an outpatient biopsy of her LNs. For her sickle cell disease, she states she is followed by another physician. She is usually only hospitalized 1-2 times a year for acute sickle cell crisis, and she denies any current acute sickle cell pain. Upon admission here, her Hgb has ranged between 5.2 and 6.0 with retic 8.1 and bili 2.2. She reportedly has several antibodies. She is currently receiving 1 Unit of PRBCs.         Past Medical History:   Diagnosis Date    A-fib [facility])     Arthritis     Chronic ulcer of left leg ([address]     H/O tracheostomy     decannulated    Heart murmur     Hemorrhagic stroke ([address] 1/2015    Ill-defined condition     sickle cell    Intracranial hemorrhage ([address] 1/2015    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    PEG (percutaneous endoscopic gastrostomy) status ([address]     removed    Right atrial thrombus     Right sided weakness     from hemorrhagic stroke    Sickle cell disease [facility]       Past Surgical History:   Procedure Laterality Date    HX CHOLECYSTECTOMY      HX OTHER SURGICAL  1/2015    PEG    HX OTHER SURGICAL      brain hemorrhage evacuation    HX TONSILLECTOMY      HX TRACHEOSTOMY  1/2015      Social History   Substance Use Topics    Smoking status: Never Smoker    Smokeless tobacco: Never Used    Alcohol use No      Family History   Problem Relation Age of Onset    Hypertension Other      Current Facility-Administered Medications   Medication Dose Route Frequency    ciprofloxacin HCl (CIPRO) tablet 500 mg  500 mg Oral Q12H    trimethoprim-sulfamethoxazole (BACTRIM DS, SEPTRA DS) 160-800 mg per tablet 1 Tab  1 Tab Oral Q12H    HYDROmorphone (DILAUDID) injection 1 mg  1 mg IntraVENous Q6H PRN    oxyCODONE IR (ROXICODONE) tablet 15 mg  15 mg Oral Q4H PRN    gabapentin (NEURONTIN) capsule 300 mg  300 mg Oral QHS    rivaroxaban (XARELTO) tablet 20 mg  20 mg Oral DAILY    levalbuterol (XOPENEX) nebulizer soln 1.25 mg/3 mL  1.25 mg Nebulization Q6H PRN    sodium chloride (NS) flush 5-10 mL  5-10 mL IntraVENous Q8H    sodium chloride (NS) flush 5-10 mL  5-10 mL IntraVENous PRN    acetaminophen (TYLENOL) tablet 650 mg  650 mg Oral Q6H PRN    ondansetron (ZOFRAN) injection 4 mg  4 mg IntraVENous Q4H PRN    LORazepam (ATIVAN) injection 1 mg  1 mg IntraVENous L6N PRN    folic acid (FOLVITE) tablet 1 mg  1 mg Oral DAILY    0.9% sodium chloride infusion 250 mL  250 mL IntraVENous PRN    saline peripheral flush soln 10 mL  10 mL InterCATHeter PRN    SALINE PERIPHERAL FLUSH Q8H soln 10 mL  10 mL InterCATHeter Q8H    amiodarone (CORDARONE) tablet 400 mg  400 mg Oral BID    levETIRAcetam (KEPPRA) tablet 250 mg  250 mg Oral BID      Allergies   Allergen Reactions    Pcn [Penicillins] Hives    Silvadene [Silver Sulfadiazine] Hives    Zantac [Ranitidine Hcl] Itching        Review of Systems: A complete review of systems was obtained, negative except as described above. Physical Exam:     Visit Vitals    /72 (BP 1 Location: Left arm, BP Patient Position: At rest)    Pulse 67    Temp 97.7 °F (36.5 °C)    Resp 16    Ht 5' 4\" (1.626 m)    Wt 100 lb (45.4 kg)    SpO2 99%    BMI 17.17 kg/m2     ECOG PS: 2  General: No distress  Eyes: PERRLA, anicteric sclerae  HENT: Atraumatic with normal appearance of ears and nose; OP clear  Neck: Supple; no thyromegaly   Lymphatic: No cervical, supraclavicular adenopathy  Respiratory: CTAB, normal respiratory effort  CV: Normal rate, regular rhythm, no murmurs. R thigh with 2+ pitting edema  GI: Soft, nontender, nondistended, no masses, no hepatomegaly, no splenomegaly  : Martinez in place draining yellow urine  MS: Normal gait and station. Digits without clubbing or cyanosis. Skin: No rashes, ecchymoses, or petechiae. Normal temperature, turgor, and texture. Neuro/Psych: Alert, oriented, appropriate affect, poor insight to her medical diagnoses. Results:     Lab Results   Component Value Date/Time    WBC 7.2 02/05/2018 02:36 AM    HGB 6.4 02/05/2018 02:36 AM    HCT 19.0 02/05/2018 02:36 AM    PLATELET 633 15/79/0631 02:36 AM    MCV 74.5 02/05/2018 02:36 AM    ABS. NEUTROPHILS 3.7 02/05/2018 02:36 AM     Lab Results   Component Value Date/Time    Sodium 147 02/05/2018 02:36 AM    Potassium 4.1 02/05/2018 02:36 AM    Chloride 117 02/05/2018 02:36 AM    CO2 21 02/05/2018 02:36 AM    Glucose 79 02/05/2018 02:36 AM    BUN 23 02/05/2018 02:36 AM    Creatinine 0.70 02/05/2018 02:36 AM    GFR est AA >60 02/05/2018 02:36 AM    GFR est non-AA >60 02/05/2018 02:36 AM    Calcium 7.7 02/05/2018 02:36 AM     Lab Results   Component Value Date/Time    Bilirubin, total 1.5 02/05/2018 02:36 AM    ALT (SGPT) 32 02/05/2018 02:36 AM    AST (SGOT) 87 02/05/2018 02:36 AM    Alk.  phosphatase 152 02/05/2018 02:36 AM    Protein, total 6.2 02/05/2018 02:36 AM    Albumin 1.9 02/05/2018 02:36 AM    Globulin 4.3 02/05/2018 02:36 AM       Assessment and Recommendations:   Bill Heredia is a 36 y.o. female admitted after seizure with AF with RVR, being treated for possible sepsis. 1. Sickle cell anemia:     From hemolysis in sickle crises    -- Transfuse to goal Hgb >= 6.5 -7 range    2. Lymphoma of stomach: Being [place] by [place]. Tiffanie Garzon [place] [facility] [place] and patient was recently seen a few weeks ago. I am unable to access U records at this time and recommend that patient follow up with Dr. Braulio Wilson after discharge from hospital.      Signed By: Jesenia Laguna NP     February 5, 2018             Attending Physician Note:     I have reviewed the history, physical examination, assessment and the plan of the care of the patient. I saw the patient with Edel Davis and I participated in the examination and critical decision making. I agree with the note as stated above. Ms. Carmen Stephen is a women with sickle cell disease. She is admitted with pain crises. Hgb remains low. She may need one more unit of RBC transfusion. She is feeling better.         Signed by: Garrison Richards MD                     February 5, 2018

## 2018-02-05 NOTE — INTERDISCIPLINARY ROUNDS
Interdisciplinary team rounds were held 2/5/2018 with the following team members:Care Management, Nursing, Nutrition, Pharmacy and Physician and the patient. Plan of care discussed. See clinical pathway and/or care plan for interventions and desired outcomes.

## 2018-02-05 NOTE — CDMP QUERY
Account Number: [de-identified]  MRN: [id number]  Patient: Slava Arroyo  Created: 1995-05-62X52:05:05  Clinician Name: Douglas Dotson M :  Please clarify if this patient is (was) being treated/managed for:     => septic shock, resolved, as evidenced by bp 77/38, 80/50, 83/49, unresponsive req levophed, NS 2.5L, ivfs, icu, abx  => Other explanation of clinical findings  => Unable to determine (no explanation for clinical findings)    The medical record reflects the following clinical findings, treatment, and risk factors. Risk Factors:  42f adm w/ sepsis d/t chronic LLE wound, hx lymphoma  Clinical Indicators:bp 77/38, 80/50, 83/49, MAP 44, ED--unresponsive  Treatment: levophed, NS 2.5L, ivfs, icu, abx    Please clarify and document your clinical opinion in the progress notes and discharge summary including the definitive and/or presumptive diagnosis, (suspected or probable), related to the above clinical findings. Please include clinical findings supporting your diagnosis.   Thank Jessica Acevedo

## 2018-02-05 NOTE — PROGRESS NOTES
2/5/2018 8:40 AM    Admit Date: 2/2/2018    Admit Diagnosis: Sepsis ([address]    Subjective:     Kael Kenny is more awake. She denies chest pain, chest pressure/discomfort, dyspnea, palpitations, irregular heart beats, near-syncope, syncope.     Visit Vitals    /71 (BP 1 Location: Left arm, BP Patient Position: At rest)    Pulse 64    Temp 97.6 °F (36.4 °C)    Resp 16    Ht 5' 4\" (1.626 m)    Wt 100 lb (45.4 kg)    SpO2 100%    BMI 17.16 kg/m2     Current Facility-Administered Medications   Medication Dose Route Frequency    HYDROmorphone (DILAUDID) injection 1 mg  1 mg IntraVENous Q6H PRN    oxyCODONE IR (ROXICODONE) tablet 15 mg  15 mg Oral Q4H PRN    gabapentin (NEURONTIN) capsule 300 mg  300 mg Oral QHS    rivaroxaban (XARELTO) tablet 20 mg  20 mg Oral DAILY    vancomycin (VANCOCIN) 750 mg in 0.9% sodium chloride (MBP/ADV) 250 mL  750 mg IntraVENous Q24H    0.9% sodium chloride infusion  100 mL/hr IntraVENous CONTINUOUS    cefepime (MAXIPIME) 2 g in 0.9 %  mL IVPB  2 g IntraVENous Q12H    levalbuterol (XOPENEX) nebulizer soln 1.25 mg/3 mL  1.25 mg Nebulization Q6H PRN    sodium chloride (NS) flush 5-10 mL  5-10 mL IntraVENous Q8H    sodium chloride (NS) flush 5-10 mL  5-10 mL IntraVENous PRN    acetaminophen (TYLENOL) tablet 650 mg  650 mg Oral Q6H PRN    ondansetron (ZOFRAN) injection 4 mg  4 mg IntraVENous Q4H PRN    LORazepam (ATIVAN) injection 1 mg  1 mg IntraVENous G7V PRN    folic acid (FOLVITE) tablet 1 mg  1 mg Oral DAILY    0.9% sodium chloride infusion 250 mL  250 mL IntraVENous PRN    saline peripheral flush soln 10 mL  10 mL InterCATHeter PRN    SALINE PERIPHERAL FLUSH Q8H soln 10 mL  10 mL InterCATHeter Q8H    metroNIDAZOLE (FLAGYL) IVPB premix 500 mg  500 mg IntraVENous Q12H    amiodarone (CORDARONE) tablet 400 mg  400 mg Oral BID    levETIRAcetam (KEPPRA) tablet 250 mg  250 mg Oral BID         Objective:      Visit Vitals    /71 (BP 1 Location: Left arm, BP Patient Position: At rest)    Pulse 64    Temp 97.6 °F (36.4 °C)    Resp 16    Ht 5' 4\" (1.626 m)    Wt 100 lb (45.4 kg)    SpO2 100%    BMI 17.16 kg/m2       Physical Exam:  Abdomen: soft, non-tender. Bowel sounds normal.   Extremities: no cyanosis or edema  Heart: regular rate and rhythm, S1, S2 normal, no murmur, click, rub or gallop  Lungs: clear to auscultation bilaterally  Neurologic: Grossly normal  Pulses: 2+     Data Review:   Labs:    Recent Results (from the past 24 hour(s))   CBC WITH AUTOMATED DIFF    Collection Time: 02/05/18  2:36 AM   Result Value Ref Range    WBC 7.2 3.6 - 11.0 K/uL    RBC 2.55 (L) 3.80 - 5.20 M/uL    HGB 6.4 (L) 11.5 - 16.0 g/dL    HCT 19.0 (L) 35.0 - 47.0 %    MCV 74.5 (L) 80.0 - 99.0 FL    MCH 25.1 (L) 26.0 - 34.0 PG    MCHC 33.7 30.0 - 36.5 g/dL    RDW 19.0 (H) 11.5 - 14.5 %    PLATELET 535 356 - 901 K/uL    MPV 10.8 8.9 - 12.9 FL    NRBC 3.7 (H) 0  WBC    ABSOLUTE NRBC 0.27 (H) 0.00 - 0.01 K/uL    NEUTROPHILS 50 32 - 75 %    BAND NEUTROPHILS 1 %    LYMPHOCYTES 36 12 - 49 %    MONOCYTES 6 5 - 13 %    EOSINOPHILS 3 0 - 7 %    BASOPHILS 2 (H) 0 - 1 %    METAMYELOCYTES 1 %    MYELOCYTES 1 %    IMMATURE GRANULOCYTES 0 0.0 - 0.5 %    ABS. NEUTROPHILS 3.7 1.8 - 8.0 K/UL    ABS. LYMPHOCYTES 2.6 0.8 - 3.5 K/UL    ABS. MONOCYTES 0.4 0.0 - 1.0 K/UL    ABS. EOSINOPHILS 0.2 0.0 - 0.4 K/UL    ABS. BASOPHILS 0.1 0.0 - 0.1 K/UL    ABS. IMM.  GRANS. 0.0 0.00 - 0.04 K/UL    DF AUTOMATED      PLATELET COMMENTS SMUDGE CELLS SEEN      RBC COMMENTS SICKLE CELLS  PRESENT        RBC COMMENTS TARGET CELLS  PRESENT        RBC COMMENTS POLYCHROMASIA  PRESENT        RBC COMMENTS WOODROW CELLS  PRESENT        RBC COMMENTS ANISOCYTOSIS  2+        RBC COMMENTS SCHISTOCYTES  PRESENT       METABOLIC PANEL, COMPREHENSIVE    Collection Time: 02/05/18  2:36 AM   Result Value Ref Range    Sodium 147 (H) 136 - 145 mmol/L    Potassium 4.1 3.5 - 5.1 mmol/L    Chloride 117 (H) 97 - 108 mmol/L    CO2 21 21 - 32 mmol/L    Anion gap 9 5 - 15 mmol/L    Glucose 79 65 - 100 mg/dL    BUN 23 (H) 6 - 20 MG/DL    Creatinine 0.70 0.55 - 1.02 MG/DL    BUN/Creatinine ratio 33 (H) 12 - 20      GFR est AA >60 >60 ml/min/1.73m2    GFR est non-AA >60 >60 ml/min/1.73m2    Calcium 7.7 (L) 8.5 - 10.1 MG/DL    Bilirubin, total 1.5 (H) 0.2 - 1.0 MG/DL    ALT (SGPT) 32 12 - 78 U/L    AST (SGOT) 87 (H) 15 - 37 U/L    Alk. phosphatase 152 (H) 45 - 117 U/L    Protein, total 6.2 (L) 6.4 - 8.2 g/dL    Albumin 1.9 (L) 3.5 - 5.0 g/dL    Globulin 4.3 (H) 2.0 - 4.0 g/dL    A-G Ratio 0.4 (L) 1.1 - 2.2     C REACTIVE PROTEIN, QT    Collection Time: 02/05/18  2:36 AM   Result Value Ref Range    C-Reactive protein 4.01 (H) 0.00 - 0.60 mg/dL       Telemetry: normal sinus rhythm      Assessment:     Active Problems:    Sickle cell crisis ([address] (4/21/2015)      Sickle cell anemia (H[facility]) (4/21/2015)      Sepsis ([address] (4/21/2015)      Leg wound, left (2/3/2018)      Abnormal LFTs (2/3/2018)      A-fib ([address] (2/3/2018)      Multiple myeloma ([address] (2/3/2018)      Seizures ([address] (2/3/2018)        Plan:     1. A. Fib with RVR: remais in sinus rhythm. Continue PO amio. No AC due to h/o ICH. Echo noted. Rt atrial Echo density unchanged. Will get cardiac MRI. 2. Sickle cell, anemia: getting BT. Hematology consulted. 3. Renal insuff resolved.     4. Sepsis: per hospitalist. On

## 2018-02-05 NOTE — PROGRESS NOTES
Pharmacy Automatic Renal Dosing Protocol - Antimicrobials    Indication for Antimicrobials: Left leg infection      Current Regimen of Each Antimicrobial:  Cefepime 2 g IV every 12 hours (Start Date 2/3; Day # 3  Metronidazole 500 mg IV every 12 hours (Start Date 2/3; Day # 3  Vancomycin 750 mg IV every 24 hours (Start Date 2/3; Day # 3     Previous Antimicrobial Therapy:  None    Goal Level: VANCOMYCIN TROUGH GOAL RANGE  Vancomycin Trough: 15 - 20 mcg/mL (for now)  Measured / Extrapolated Vancomycin Level: None    Significant Cultures:   2/3 Blood: NG - pending  2/3 Wound: Heavy probable pseudomonas, moderate probable staph aureus, and moderate diphtheroids- pending    Paralysis, amputations, malnutrition: None documented    Labs:  Recent Labs      18   0236  18   0240  /18   0452   CREA  0.70  1.01  1.47*   BUN  23*  30*  38*   WBC  7.2  13.4*  15.9*     Temp (24hrs), Av.4 °F (36.3 °C), Min:97.2 °F (36.2 °C), Max:97.6 °F (36.4 °C)      Creatinine Clearance (mL/min) or Dialysis: 51 mL/min   No results found for: PCT    Impression/Plan:    Will continue current regimen of metronidazole and cefepime as appropriate for indication and renal function. If we continue with the current dose of vancomycin, the predicted trough would be ~3 mcg/ml. Will adjust the vancomycin to 1g q 12 hours and this will yield an estimated trough of 16 mcg/ml  The plan was to take a vancomycin trough, but the dose is being adjusted based on the current renal function; therefore, will check trough   Will follow cx  Patient extremely nauseated and will not change the metronidazole to PO yet. Pharmacy will follow daily and adjust medications as appropriate for renal function and/or serum levels. Thank you,  Karl Mckeon, EVARISTOD      Recommended duration of therapy  [email or web address]/Pharmacy/Clinical%20Companion/[email or web address]. docx    Renal Dosing  [email or web address]/Pharmacy/[email or web address]/Renal%[id number]. pdf

## 2018-02-05 NOTE — PROGRESS NOTES
Hospitalist Progress Note    NAME: Adilene Marrero   :  1975   MRN:  [id number]       Assessment / Plan:  Septic shock due to left leg infection in setting of chronic LLE wound, present on admission:  Low blood pressure and hypothermia on presentation, resolved. Initially on levophed. - CXR with mild pulmonary edema pattern  - wound cultures heavy sensitive pseudomonas and heavy quinolone resistant MSSA  - start cipro, bactrim based on sensitivities. Stop emperic IV vancomycin, cefepime and flagyl.  - wound care consulted  Possible seizures with h/o ICH and remote CVA:  None since admission, no history of seizure. Per family seizure lasted a few seconds, after Pt was post-ictal with right sided deficits and slurred speech per EMS. - CT head with no acute intracranial process. 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 - neuro consult appreciated  - EEG still pending (was not done yesterday as Pt was in [address])  - con't low-dose keppra with prn ativan for now  Afib with RVR and history of R atrial mass: converted to NSR shortly after admission  - echo with EF 60-65%. There were no regional wall motion abnormalities. - con't oral amiodarone and home xarelto  - appreciate cardiology consult  - cardiac MRI planned for today to re-evaluate atrial mass while here  YEN:  Improving Cr, but not at baseline (baseline looks Cr ~0.4)  - UA on admission benign  - will try off IVF  - had urinary retention (350mL) on admission, garcia d/c'd 2/  Sickle cell crisis with severe anemia:  Hg slightly lower today  - transfused 3u pRBCs this admission  - appreciate hematology consult  - still holding home high-dose MS contin as Pt became extremely lethargic with lower blood pressure after receiving here. Con't home oxycodone at half her usual dose.   Abnormal LFTs, imroving: suspect due to sepsis and sickling, con't to monitor  Lymphoma of the stomach:  Follows at [facility] Jessica Bailey), plans for LN biopsy in the near future, but not currently on treatment  - RLE doppler 2/4 with no DVT  Underweight (Body mass index is 17.17 kg/(m^2)      Code Status: Full    DVT Prophylaxis: Lovenox     Subjective:     Chief Complaint / Reason for Physician Visit  \"When can I go home? \". Discussed with RN events overnight. Review of Systems:  Symptom Y/N Comments  Symptom Y/N Comments   Fever/Chills n   Chest Pain n    Poor Appetite n   Edema y    Cough n   Abdominal Pain n    Sputum n   Joint Pain     SOB/DELGADO n   Pruritis/Rash     Nausea/vomit    Tolerating PT/OT     Diarrhea    Tolerating Diet     Constipation    Other       Could NOT obtain due to:      Objective:     VITALS:   Last 24hrs VS reviewed since prior progress note. Most recent are:  Patient Vitals for the past 24 hrs:   Temp Pulse Resp BP SpO2   02/05/18 0716 - 64 - - 100 %   02/05/18 0714 97.6 °F (36.4 °C) 66 16 117/71 100 %   02/05/18 0300 - 62 - 102/60 -   02/05/18 0200 - (!) 57 - 103/64 -   02/05/18 0100 97.4 °F (36.3 °C) 65 18 129/67 94 %   02/04/18 2000 97.5 °F (36.4 °C) 62 18 105/61 98 %   02/04/18 1717 - 65 - 106/59 -   02/04/18 1600 97.2 °F (36.2 °C) 78 16 133/89 99 %   02/04/18 1430 - 66 - 108/61 -   02/04/18 1400 - 61 - 102/59 -   02/04/18 1300 - 66 - 103/59 -   02/04/18 1230 - 67 - 105/58 100 %   02/04/18 1200 - 63 - 105/57 -   02/04/18 1130 97.3 °F (36.3 °C) 65 17 95/61 100 %   02/04/18 1100 - 70 - 108/61 100 %   02/04/18 1030 - 70 - 113/71 -       Intake/Output Summary (Last 24 hours) at 02/05/18 1002  Last data filed at 02/05/18 0334   Gross per 24 hour   Intake          2376.67 ml   Output             1250 ml   Net          1126.67 ml        PHYSICAL EXAM:  General: WD, WN. Alert, cooperative, no acute distress    EENT:  EOMI. Anicteric sclerae. MMM  Resp:  CTA bilaterally, no wheezing or rales.   No accessory muscle use  CV:  Regular rhythm,  3+ RLE edema (baseline per Pt)  GI:  Soft, Non distended, Non tender.  +Bowel sounds  Neurologic:  Alert and oriented X 3, normal speech,   Psych:   Limited insight. Not anxious nor agitated  Skin:  No rashes. No jaundice    Reviewed most current lab test results and cultures  YES  Reviewed most current radiology test results   YES  Review and summation of old records today    NO  Reviewed patient's current orders and MAR    YES  PMH/SH reviewed - no change compared to H&P  ________________________________________________________________________  Care Plan discussed with:    Comments   Patient x    Family      RN x    Care Manager x    Consultant                       x Multidiciplinary team rounds were held today with , nursing, pharmacist and clinical coordinator. Patient's plan of care was discussed; medications were reviewed and discharge planning was addressed. ________________________________________________________________________  Total NON critical care TIME:  35 Minutes    Total CRITICAL CARE TIME Spent:   Minutes non procedure based      Comments   >50% of visit spent in counseling and coordination of care x    ________________________________________________________________________  Martha Herndon MD     Procedures: see electronic medical records for all procedures/Xrays and details which were not copied into this note but were reviewed prior to creation of Plan. LABS:  I reviewed today's most current labs and imaging studies. Pertinent labs include:  Recent Labs      02/05/18   0236  02/04/18   0240  02/03/18   1755   02/03/18   0452   WBC  7.2  13.4*   --    --   15.9*   HGB  6.4*  7.0*  6.9*   < >  5.2*   HCT  19.0*  20.2*  20.8*   < >  15.2*   PLT  335  358   --    --   377    < > = values in this interval not displayed.      Recent Labs      02/05/18   0236  02/04/18   0240  02/03/18   0452   NA  147*  144  139   K  4.1  4.1  3.8   CL  117*  115*  108   CO2  21  23  22   GLU  79  89  97   BUN  23*  30*  38*   CREA  0.70  1.01  1.47*   CA  7.7*  7.5*  7.3*   ALB  1.9*  2.0*  2.1*   TBILI  1.5*  2.0*  2.2* SGOT  87*  116*  130*   ALT  32  40  41       Signed: Ranjit Grady MD

## 2018-02-05 NOTE — PROGRESS NOTES
NEUROLOGY NOTE       Chief Complaint   Patient presents with    Seizure     EMS reports family noting several seconds of seizure activity. SUBJECTIVE:  No seizures since admission  EEG is pending    HISTORY OF PRESENT ILLNESS  Yadi Real is a 36 y.o. female who presents to the hospital because of a seizure. Pt remembers going to the bathroom and putting on her gown and felt as if her face was twisting and her arms were jerking. She does not remember what happened next. According to ER notes: Per EMS they were called by  while the pt was seizing. They state that seizure lasted a few seconds before resolving. After the seizure the pt was post ictal with right sided deficits and slurred speech per EMS. 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 No hx of seizures in the past. Stroke was around 4 yrs ago.      ROS  A ten system review of constitutional, cardiovascular, respiratory, musculoskeletal, endocrine, skin, SHEENT, genitourinary, psychiatric and neurologic systems was obtained and is unremarkable except as stated in HPI     PMH  Past Medical History:   Diagnosis Date    [facility] [address]     Arthritis     Chronic ulcer of left leg ([facility] [address]     H/O tracheostomy     decannulated    Heart murmur     Hemorrhagic stroke ([address] 1/2015    Ill-defined condition     sickle cell    Intracranial hemorrhage ([address] 1/2015    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[facility] [address] 1/2015    PEG (percutaneous endoscopic gastrostomy) status ([facility] [address]     removed    Right atrial thrombus     Right sided weakness     from hemorrhagic stroke    Sickle cell disease ([facility] [address]        FH  Family History   Problem Relation Age of Onset    Hypertension Other        SH  Social History     Social History    Marital status: SINGLE     Spouse name: N/A    Number of children: N/A    Years of education: N/A     Social History Main Topics    Smoking status: Never Smoker    Smokeless tobacco: Never Used    Alcohol use No    Drug use: No    Sexual activity: Not Asked     Other Topics Concern    None     Social History Narrative       ALLERGIES  Allergies   Allergen Reactions    Pcn [Penicillins] Hives    Silvadene [Silver Sulfadiazine] Hives    Zantac [Ranitidine Hcl] Itching       PHYSICAL EXAM  EXAMINATION:   Patient Vitals for the past 24 hrs:   Temp Pulse Resp BP SpO2   02/05/18 0716 - 64 - - 100 %   02/05/18 0714 97.6 °F (36.4 °C) 66 16 117/71 100 %   02/05/18 0300 - 62 - 102/60 -   02/05/18 0200 - (!) 57 - 103/64 -   02/05/18 0100 97.4 °F (36.3 °C) 65 18 129/67 94 %   02/04/18 2000 97.5 °F (36.4 °C) 62 18 105/61 98 %   02/04/18 1717 - 65 - 106/59 -   02/04/18 1600 97.2 °F (36.2 °C) 78 16 133/89 99 %   02/04/18 1430 - 66 - 108/61 -   02/04/18 1400 - 61 - 102/59 -        General:   General appearance: Pt is in no acute distress   Distal pulses are preserved    Neurological Examination:   Mental Status:  AAO x3. Speech is fluent. Follows commands, has normal fund of knowledge, attention, short term recall, comprehension and insight. Cranial Nerves: Visual fields are full. PERRL, Extraocular movements are full. Facial sensation intact. Facial movement intact. Hearing intact to conversation. Palate elevates symmetrically. Shoulder shrug symmetric. Tongue midline. Motor: Strength is 5/5 in all 4 ext. Normal tone. No atrophy. Sensation: Normal to light touch    Coordination/Cerebellar: Intact to finger-nose-finger     Skin: No significant bruising or lacerations.     LAB DATA REVIEWED:    Recent Results (from the past 24 hour(s))   CBC WITH AUTOMATED DIFF    Collection Time: 02/05/18  2:36 AM   Result Value Ref Range    WBC 7.2 3.6 - 11.0 K/uL    RBC 2.55 (L) 3.80 - 5.20 M/uL    HGB 6.4 (L) 11.5 - 16.0 g/dL    HCT 19.0 (L) 35.0 - 47.0 %    MCV 74.5 (L) 80.0 - 99.0 FL    MCH 25.1 (L) 26.0 - 34.0 PG    MCHC 33.7 30.0 - 36.5 g/dL    RDW 19.0 (H) 11.5 - 14.5 % PLATELET 112 554 - 214 K/uL    MPV 10.8 8.9 - 12.9 FL    NRBC 3.7 (H) 0  WBC    ABSOLUTE NRBC 0.27 (H) 0.00 - 0.01 K/uL    NEUTROPHILS 50 32 - 75 %    BAND NEUTROPHILS 1 %    LYMPHOCYTES 36 12 - 49 %    MONOCYTES 6 5 - 13 %    EOSINOPHILS 3 0 - 7 %    BASOPHILS 2 (H) 0 - 1 %    METAMYELOCYTES 1 %    MYELOCYTES 1 %    IMMATURE GRANULOCYTES 0 0.0 - 0.5 %    ABS. NEUTROPHILS 3.7 1.8 - 8.0 K/UL    ABS. LYMPHOCYTES 2.6 0.8 - 3.5 K/UL    ABS. MONOCYTES 0.4 0.0 - 1.0 K/UL    ABS. EOSINOPHILS 0.2 0.0 - 0.4 K/UL    ABS. BASOPHILS 0.1 0.0 - 0.1 K/UL    ABS. IMM. GRANS. 0.0 0.00 - 0.04 K/UL    DF AUTOMATED      PLATELET COMMENTS SMUDGE CELLS SEEN      RBC COMMENTS SICKLE CELLS  PRESENT        RBC COMMENTS TARGET CELLS  PRESENT        RBC COMMENTS POLYCHROMASIA  PRESENT        RBC COMMENTS WOODROW CELLS  PRESENT        RBC COMMENTS ANISOCYTOSIS  2+        RBC COMMENTS SCHISTOCYTES  PRESENT       METABOLIC PANEL, COMPREHENSIVE    Collection Time: 02/05/18  2:36 AM   Result Value Ref Range    Sodium 147 (H) 136 - 145 mmol/L    Potassium 4.1 3.5 - 5.1 mmol/L    Chloride 117 (H) 97 - 108 mmol/L    CO2 21 21 - 32 mmol/L    Anion gap 9 5 - 15 mmol/L    Glucose 79 65 - 100 mg/dL    BUN 23 (H) 6 - 20 MG/DL    Creatinine 0.70 0.55 - 1.02 MG/DL    BUN/Creatinine ratio 33 (H) 12 - 20      GFR est AA >60 >60 ml/min/1.73m2    GFR est non-AA >60 >60 ml/min/1.73m2    Calcium 7.7 (L) 8.5 - 10.1 MG/DL    Bilirubin, total 1.5 (H) 0.2 - 1.0 MG/DL    ALT (SGPT) 32 12 - 78 U/L    AST (SGOT) 87 (H) 15 - 37 U/L    Alk. phosphatase 152 (H) 45 - 117 U/L    Protein, total 6.2 (L) 6.4 - 8.2 g/dL    Albumin 1.9 (L) 3.5 - 5.0 g/dL    Globulin 4.3 (H) 2.0 - 4.0 g/dL    A-G Ratio 0.4 (L) 1.1 - 2.2     C REACTIVE PROTEIN, QT    Collection Time: 02/05/18  2:36 AM   Result Value Ref Range    C-Reactive protein 4.01 (H) 0.00 - 0.60 mg/dL        Imaging review:  CT Head  No acute intracranial process. 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     HOME MEDS  Prior to Admission Medications   Prescriptions Last Dose Informant Patient Reported? Taking? morphine CR (MS CONTIN) 100 mg CR tablet 2/2/2018 at Unknown time  Yes Yes   Sig: Take 100 mg by mouth every twelve (12) hours. oxyCODONE IR (ROXICODONE) 30 mg immediate release tablet 2/2/2018 at Unknown time  Yes Yes   Sig: Take 30 mg by mouth every four (4) hours as needed for Pain. Facility-Administered Medications: None       CURRENT MEDS  Current Facility-Administered Medications   Medication Dose Route Frequency    vancomycin (VANCOCIN) 1,000 mg in 0.9% sodium chloride (MBP/ADV) 250 mL  1,000 mg IntraVENous Q12H    gabapentin (NEURONTIN) capsule 300 mg  300 mg Oral QHS    rivaroxaban (XARELTO) tablet 20 mg  20 mg Oral DAILY    0.9% sodium chloride infusion  100 mL/hr IntraVENous CONTINUOUS    cefepime (MAXIPIME) 2 g in 0.9 %  mL IVPB  2 g IntraVENous Q12H    sodium chloride (NS) flush 5-10 mL  5-10 mL IntraVENous G8K    folic acid (FOLVITE) tablet 1 mg  1 mg Oral DAILY    SALINE PERIPHERAL FLUSH Q8H soln 10 mL  10 mL InterCATHeter Q8H    metroNIDAZOLE (FLAGYL) IVPB premix 500 mg  500 mg IntraVENous Q12H    amiodarone (CORDARONE) tablet 400 mg  400 mg Oral BID    levETIRAcetam (KEPPRA) tablet 250 mg  250 mg Oral BID       IMPRESSION:  Genaro Crespo is a 36 y.o. female who presents with a seizures. Pt did have a seizure and does have hx of stroke and hence there is a high risk of recurrence. No seizures since admission. Will continue keppra 250 mg po bid. EEG is pending. RECOMMENDATIONS:  1. EEG is pending  2. Keppra 250 mg po bid. 3. Discussed about [facility] rule of no driving for 6 months. Call with questions. No further recommendations. Will get the eeg although the result with no change the treatment plan.      Hawk Davies MD  Neurologist

## 2018-02-06 ENCOUNTER — APPOINTMENT (OUTPATIENT)
Dept: MRI IMAGING | Age: 43
DRG: 871 | End: 2018-02-06
Attending: INTERNAL MEDICINE
Payer: MEDICARE

## 2018-02-06 LAB
ABO + RH BLD: NORMAL
ALBUMIN SERPL-MCNC: 1.9 G/DL (ref 3.5–5)
ALBUMIN/GLOB SERPL: 0.4 {RATIO} (ref 1.1–2.2)
ALP SERPL-CCNC: 149 U/L (ref 45–117)
ALT SERPL-CCNC: 30 U/L (ref 12–78)
ANION GAP SERPL CALC-SCNC: 7 MMOL/L (ref 5–15)
ANTI-COMPLEMENT (C3B,C3D): NORMAL
ANTIGENS PRESENT RBC DONR: NORMAL
AST SERPL-CCNC: 71 U/L (ref 15–37)
BILIRUB SERPL-MCNC: 1.4 MG/DL (ref 0.2–1)
BLD PROD TYP BPU: NORMAL
BLOOD BANK CMNT PATIENT-IMP: NORMAL
BLOOD GROUP ANTIBODIES SERPL: NORMAL
BLOOD GROUP ANTIBODIES SERPL: NORMAL
BPU ID: NORMAL
BUN SERPL-MCNC: 20 MG/DL (ref 6–20)
BUN/CREAT SERPL: 27 (ref 12–20)
CALCIUM SERPL-MCNC: 8.1 MG/DL (ref 8.5–10.1)
CHLORIDE SERPL-SCNC: 117 MMOL/L (ref 97–108)
CO2 SERPL-SCNC: 22 MMOL/L (ref 21–32)
CREAT SERPL-MCNC: 0.75 MG/DL (ref 0.55–1.02)
CROSSMATCH RESULT,%XM: NORMAL
CRP SERPL-MCNC: 2.65 MG/DL (ref 0–0.6)
DAT IGG-SP REAG RBC QL: NORMAL
DAT POLY-SP REAG RBC QL: NORMAL
ERYTHROCYTE [DISTWIDTH] IN BLOOD BY AUTOMATED COUNT: 18.7 % (ref 11.5–14.5)
GLOBULIN SER CALC-MCNC: 4.4 G/DL (ref 2–4)
GLUCOSE SERPL-MCNC: 82 MG/DL (ref 65–100)
HCT VFR BLD AUTO: 18.5 % (ref 35–47)
HGB BLD-MCNC: 6.5 G/DL (ref 11.5–16)
MCH RBC QN AUTO: 25.6 PG (ref 26–34)
MCHC RBC AUTO-ENTMCNC: 35.1 G/DL (ref 30–36.5)
MCV RBC AUTO: 72.8 FL (ref 80–99)
NRBC # BLD: 0.21 K/UL (ref 0–0.01)
NRBC BLD-RTO: 2.1 PER 100 WBC
PHYSICIAN INSTRUCTIO,%PI: NORMAL
PLATELET # BLD AUTO: 368 K/UL (ref 150–400)
PMV BLD AUTO: 10.6 FL (ref 8.9–12.9)
POTASSIUM SERPL-SCNC: 4 MMOL/L (ref 3.5–5.1)
PROT SERPL-MCNC: 6.3 G/DL (ref 6.4–8.2)
RBC # BLD AUTO: 2.54 M/UL (ref 3.8–5.2)
SODIUM SERPL-SCNC: 146 MMOL/L (ref 136–145)
SPECIMEN EXP DATE BLD: NORMAL
STATUS OF UNIT,%ST: NORMAL
UNIT DIVISION, %UDIV: 0
WBC # BLD AUTO: 9.9 K/UL (ref 3.6–11)

## 2018-02-06 PROCEDURE — 97161 PT EVAL LOW COMPLEX 20 MIN: CPT

## 2018-02-06 PROCEDURE — 75557 CARDIAC MRI FOR MORPH: CPT

## 2018-02-06 PROCEDURE — 80053 COMPREHEN METABOLIC PANEL: CPT | Performed by: INTERNAL MEDICINE

## 2018-02-06 PROCEDURE — 74011250637 HC RX REV CODE- 250/637: Performed by: PSYCHIATRY & NEUROLOGY

## 2018-02-06 PROCEDURE — 74011250637 HC RX REV CODE- 250/637: Performed by: INTERNAL MEDICINE

## 2018-02-06 PROCEDURE — 97116 GAIT TRAINING THERAPY: CPT

## 2018-02-06 PROCEDURE — 65660000000 HC RM CCU STEPDOWN

## 2018-02-06 PROCEDURE — 97165 OT EVAL LOW COMPLEX 30 MIN: CPT | Performed by: OCCUPATIONAL THERAPIST

## 2018-02-06 PROCEDURE — 85027 COMPLETE CBC AUTOMATED: CPT | Performed by: INTERNAL MEDICINE

## 2018-02-06 PROCEDURE — 97530 THERAPEUTIC ACTIVITIES: CPT | Performed by: OCCUPATIONAL THERAPIST

## 2018-02-06 PROCEDURE — G8987 SELF CARE CURRENT STATUS: HCPCS | Performed by: OCCUPATIONAL THERAPIST

## 2018-02-06 PROCEDURE — 36415 COLL VENOUS BLD VENIPUNCTURE: CPT | Performed by: INTERNAL MEDICINE

## 2018-02-06 PROCEDURE — 86140 C-REACTIVE PROTEIN: CPT | Performed by: INTERNAL MEDICINE

## 2018-02-06 PROCEDURE — 74011250636 HC RX REV CODE- 250/636: Performed by: INTERNAL MEDICINE

## 2018-02-06 PROCEDURE — G8988 SELF CARE GOAL STATUS: HCPCS | Performed by: OCCUPATIONAL THERAPIST

## 2018-02-06 RX ADMIN — AMIODARONE HYDROCHLORIDE 400 MG: 200 TABLET ORAL at 10:19

## 2018-02-06 RX ADMIN — OXYCODONE HYDROCHLORIDE 15 MG: 5 TABLET ORAL at 01:39

## 2018-02-06 RX ADMIN — OXYCODONE HYDROCHLORIDE 15 MG: 5 TABLET ORAL at 22:02

## 2018-02-06 RX ADMIN — CIPROFLOXACIN HYDROCHLORIDE 500 MG: 500 TABLET, FILM COATED ORAL at 10:18

## 2018-02-06 RX ADMIN — OXYCODONE HYDROCHLORIDE 15 MG: 5 TABLET ORAL at 18:02

## 2018-02-06 RX ADMIN — OXYCODONE HYDROCHLORIDE 15 MG: 5 TABLET ORAL at 05:54

## 2018-02-06 RX ADMIN — Medication 10 ML: at 05:28

## 2018-02-06 RX ADMIN — RIVAROXABAN 20 MG: 20 TABLET, FILM COATED ORAL at 10:19

## 2018-02-06 RX ADMIN — Medication 10 ML: at 22:02

## 2018-02-06 RX ADMIN — ONDANSETRON HYDROCHLORIDE 4 MG: 2 INJECTION, SOLUTION INTRAMUSCULAR; INTRAVENOUS at 10:36

## 2018-02-06 RX ADMIN — OXYCODONE HYDROCHLORIDE 15 MG: 5 TABLET ORAL at 17:21

## 2018-02-06 RX ADMIN — GABAPENTIN 300 MG: 300 CAPSULE ORAL at 22:02

## 2018-02-06 RX ADMIN — OXYCODONE HYDROCHLORIDE 15 MG: 5 TABLET ORAL at 10:19

## 2018-02-06 RX ADMIN — SULFAMETHOXAZOLE AND TRIMETHOPRIM 1 TABLET: 800; 160 TABLET ORAL at 10:18

## 2018-02-06 RX ADMIN — SULFAMETHOXAZOLE AND TRIMETHOPRIM 1 TABLET: 800; 160 TABLET ORAL at 22:02

## 2018-02-06 RX ADMIN — Medication 10 ML: at 14:04

## 2018-02-06 RX ADMIN — LORAZEPAM 1 MG: 2 INJECTION INTRAMUSCULAR; INTRAVENOUS at 08:00

## 2018-02-06 RX ADMIN — CIPROFLOXACIN HYDROCHLORIDE 500 MG: 500 TABLET, FILM COATED ORAL at 22:02

## 2018-02-06 RX ADMIN — LEVETIRACETAM 250 MG: 250 TABLET, FILM COATED ORAL at 17:21

## 2018-02-06 RX ADMIN — ONDANSETRON HYDROCHLORIDE 4 MG: 2 INJECTION, SOLUTION INTRAMUSCULAR; INTRAVENOUS at 17:58

## 2018-02-06 RX ADMIN — AMIODARONE HYDROCHLORIDE 400 MG: 200 TABLET ORAL at 17:21

## 2018-02-06 RX ADMIN — LEVETIRACETAM 250 MG: 250 TABLET, FILM COATED ORAL at 10:19

## 2018-02-06 RX ADMIN — FOLIC ACID 1 MG: 1 TABLET ORAL at 10:19

## 2018-02-06 NOTE — PROGRESS NOTES
Problem: Mobility Impaired (Adult and Pediatric)  Goal: *Acute Goals and Plan of Care (Insert Text)  Physical Therapy Goals  Initiated 2/6/2018  1. Patient will move from supine to sit and sit to supine  in bed with independence within 7 day(s). 2.  Patient will transfer from bed to chair and chair to bed with modified independence using the least restrictive device within 7 day(s). 3.  Patient will perform sit to stand with modified independence within 7 day(s). 4.  Patient will ambulate with modified independence for 50 feet with the least restrictive device within 7 day(s). physical Therapy EVALUATION  Patient: Ethel Tate (44 y.o. female)  Date: 2/6/2018  Primary Diagnosis: Sepsis ([facility] [address]        Precautions:   Fall, Contact    ASSESSMENT :  Based on the objective data described below, the patient presents with decreased functional mobility, impaired balance and gait, generalized weakness s/p admission with sepsis and seizures. Patient received seated EOB and agreeable to therapy despite reports of nausea. Patient reported she is usually ambulatory and independent, and driving prior to admission. Pt reports living in a 1 story apartment with her mother and children. Patient reported her brother will come over and assist with cooking/cleaning. Patient tolerated evaluation fairly well. Patient completed sit<>stand with RW with min assist x 2, ambulated within the room with RW with min assist for balance as well as to direct RW to avoid running into obstacles. Patient ambulated with decreased shira, forward flexed trunk, toe walking on the R (which is baseline per pt report). Patient reported significant fatigue s/p short ambulation distance. Patient appears to be functioning well below baseline status requiring increased assistance for balance, transfers and gait. Patient will continue to benefit from PT to progress mobility and reach highest level of independence.  At this time would recommend SNF placement upon discharge. Patient will benefit from skilled intervention to address the above impairments. Patients rehabilitation potential is considered to be Fair  Factors which may influence rehabilitation potential include:   []         None noted  []         Mental ability/status  [x]         Medical condition  []         Home/family situation and support systems  []         Safety awareness  []         Pain tolerance/management  []         Other:      PLAN :  Recommendations and Planned Interventions:  [x]           Bed Mobility Training             [x]    Neuromuscular Re-Education  [x]           Transfer Training                   []    Orthotic/Prosthetic Training  [x]           Gait Training                         []    Modalities  [x]           Therapeutic Exercises           []    Edema Management/Control  [x]           Therapeutic Activities            [x]    Patient and Family Training/Education  []           Other (comment):    Frequency/Duration: Patient will be followed by physical therapy  4 times a week to address goals. Discharge Recommendations: Skilled Nursing Facility  Further Equipment Recommendations for Discharge: TBD at rehab     SUBJECTIVE:   Patient stated I am just exhausted from that.     OBJECTIVE DATA SUMMARY:   HISTORY:    Past Medical History:   Diagnosis Date    [facility] [address]     Arthritis     Chronic ulcer of left leg ([facility] [address]     H/O tracheostomy     decannulated    Heart murmur     Hemorrhagic stroke ([facility] [address] 1/2015    Ill-defined condition     sickle cell    Intracranial hemorrhage ([facility] [address] 1/2015    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[facility] [address] 1/2015    PEG (percutaneous endoscopic gastrostomy) status ([facility] [address]     removed    Right atrial thrombus     Right sided weakness     from hemorrhagic stroke    Sickle cell disease ([facility] [address]      Past Surgical History:   Procedure Laterality Date    HX CHOLECYSTECTOMY      HX OTHER SURGICAL  1/2015    PEG    HX OTHER SURGICAL      brain hemorrhage evacuation    HX TONSILLECTOMY      HX TRACHEOSTOMY  1/2015     Prior Level of Function/Home Situation: independent, ambulatory without AD, drives, lives with family in a 1 story apartment, no MACK  Personal factors and/or comorbidities impacting plan of care:     Home Situation  Home Environment: Apartment  # Steps to Enter: 0  One/Two Story Residence: One story  Living Alone: No  Support Systems: Child(darlene) (Mom)  Patient Expects to be Discharged to[de-identified] Apartment  Current DME Used/Available at Home: None    EXAMINATION/PRESENTATION/DECISION MAKING:   Critical Behavior:  Neurologic State: Eyes open to voice, Sleeping     Cognition: Appropriate decision making, Appropriate for age attention/concentration, Appropriate safety awareness, Follows commands     Hearing: Auditory  Auditory Impairment: None  Skin:  Dressing to LLE intact  Edema:   Range Of Motion:  AROM: Generally decreased, functional                       Strength:    Strength: Generally decreased, functional                    Tone & Sensation:                  Sensation: Intact               Coordination:     Vision:      Functional Mobility:  Bed Mobility:              Transfers:  Sit to Stand: Minimum assistance  Stand to Sit: Minimum assistance                       Balance:   Sitting: Intact  Standing: Impaired; With support  Standing - Static: Fair  Standing - Dynamic : Fair  Ambulation/Gait Training:  Distance (ft): 20 Feet (ft)  Assistive Device: Gait belt;Walker, rolling  Ambulation - Level of Assistance: Minimal assistance        Gait Abnormalities: Decreased step clearance; Toe walking; Step to gait (Toe walking on the R, forward flexed trunk)        Base of Support: Narrowed; Shift to left  Stance: Right decreased  Speed/Yamila: Pace decreased (<100 feet/min); Shuffled  Step Length: Right shortened;Left shortened                     Stairs:               Therapeutic Exercises:       Functional Measure:  Tinetti test:    Sitting Balance: 1  Arises: 0  Attempts to Rise: 1  Immediate Standing Balance: 1  Standing Balance: 0  Nudged: 0  Eyes Closed: 0  Turn 360 Degrees - Continuous/Discontinuous: 0  Turn 360 Degrees - Steady/Unsteady: 0  Sitting Down: 1  Balance Score: 4  Indication of Gait: 0  R Step Length/Height: 0  L Step Length/Height: 0  R Foot Clearance: 0  L Foot Clearance: 1  Step Symmetry: 1  Step Continuity: 0  Path: 1  Trunk: 0  Walking Time: 1  Gait Score: 4  Total Score: 8       Tinetti Test and G-code impairment scale:  Percentage of Impairment CH    0%   CI    1-19% CJ    20-39% CK    40-59% CL    60-79% CM    80-99% CN     100%   Tinetti  Score 0-28 28 23-27 17-22 12-16 6-11 1-5 0       Tinetti Tool Score Risk of Falls  <19 = High Fall Risk  19-24 = Moderate Fall Risk  25-28 = Low Fall Risk  Tinetti ME. Performance-Oriented Assessment of Mobility Problems in Elderly Patients. [facility]; [id number]. (Scoring Description: PT Bulletin Feb. 10, 1993)    Older adults: Marissa Steinberg et al, 2009; n = 1000 [facility] elderly evaluated with ABC, BIJU, ADL, and IADL)  · Mean BIJU score for males aged 69-68 years = 26.21(3.40)  · Mean BIJU score for females age 69-68 years = 25.16(4.30)  · Mean BIJU score for males over 80 years = 23.29(6.02)  · Mean BIJU score for females over 80 years = 17.20(8.32)         G codes: In compliance with CMSs Claims Based Outcome Reporting, the following G-code set was chosen for this patient based on their primary functional limitation being treated: The outcome measure chosen to determine the severity of the functional limitation was the Tinetti with a score of 8/28 which was correlated with the impairment scale.     ? Mobility - Walking and Moving Around:     - CURRENT STATUS: CL - 60%-79% impaired, limited or restricted    - GOAL STATUS: CK - 40%-59% impaired, limited or restricted    - D/C STATUS:  ---------------To be determined---------------       Based on the above components, the patient evaluation is determined to be of the following complexity level: LOW     Pain:  Pain Scale 1: Numeric (0 - 10)  Pain Intensity 1: 5  Pain Location 1: Back  Pain Orientation 1: Right;Left  Pain Description 1: Aching;Constant  Pain Intervention(s) 1: Medication (see MAR)  Activity Tolerance:   Fair. VSS  Please refer to the flowsheet for vital signs taken during this treatment. After treatment:   [x]         Patient left in no apparent distress sitting up in chair  []         Patient left in no apparent distress in bed  [x]         Call bell left within reach  [x]         Nursing notified  []         Caregiver present  []         Bed alarm activated    COMMUNICATION/EDUCATION:   The patients plan of care was discussed with: Occupational Therapist and Registered Nurse. [x]         Fall prevention education was provided and the patient/caregiver indicated understanding. [x]         Patient/family have participated as able in goal setting and plan of care. [x]         Patient/family agree to work toward stated goals and plan of care. []         Patient understands intent and goals of therapy, but is neutral about his/her participation. []         Patient is unable to participate in goal setting and plan of care.     Thank you for this referral.  Jerardo Colin, PT, DPT   Time Calculation: 17 mins

## 2018-02-06 NOTE — PROGRESS NOTES
Problem: Self Care Deficits Care Plan (Adult)  Goal: *Acute Goals and Plan of Care (Insert Text)  Occupational Therapy Goals  Initiated 2/6/2018  1. Patient will perform bathing with independence within 7 day(s). 2.  Patient will perform upper body dressing and lower body dressing with contact guard assist within 7 day(s). 3.  Patient will perform simple home management with minimal assistance within 7 day(s). 4.  Patient will perform toilet transfers with contact guard assist within 7 day(s). 5.  Patient will perform all aspects of toileting with supervision/set-up within 7 day(s). 6.  Patient will participate in upper extremity therapeutic exercise/activities with supervision/set-up for 8 minutes within 7 day(s). 7.  Patient will perform standing adls for at least 8 minutes within 7 days. Occupational Therapy EVALUATION  Patient: Georgina Manning (58 y.o. female)  Date: 2/6/2018  Primary Diagnosis: Sepsis ([facility]        Precautions:   Fall, Contact    ASSESSMENT :  Based on the objective data described below, the patient presents with multiple medical conditions,  flat affect, question insight into deficits, history of non compliance, decreased balance, generalized weakness, new report of R side \"jerking\" s/p seizure/abnormal EEG, mildly decreased coordination with stiffness/mildly increased tone in R (old cva?) all impairing independence and safety in adls and iADLs and functional mobility. Pt is functioning at supervision to moderate assistance for self care and is generally minimal assistance for functional mobility. Pt will benefit from rehab at discharge. .    Patient will benefit from skilled intervention to address the above impairments.   Patients rehabilitation potential is considered to be Good  Factors which may influence rehabilitation potential include:   []             None noted  []             Mental ability/status  [x]             Medical condition  []             Home/family situation and support systems  []             Safety awareness  []             Pain tolerance/management  []             Other:      PLAN :  Recommendations and Planned Interventions:  [x]               Self Care Training                  [x]        Therapeutic Activities  [x]               Functional Mobility Training    []        Cognitive Retraining  [x]               Therapeutic Exercises           [x]        Endurance Activities  [x]               Balance Training                   [x]        Neuromuscular Re-Education  []               Visual/Perceptual Training     [x]   Home Safety Training  [x]               Patient Education                 [x]        Family Training/Education  []               Other (comment):    Frequency/Duration: Patient will be followed by occupational therapy 3 times a week to address goals. Discharge Recommendations: Rehab and Rigo Sin  Further Equipment Recommendations for Discharge: tbd     SUBJECTIVE:   Patient stated I'm going to feel like a prisoner. ...... I can't drive for 6 months. ... Erika Rivas Why is that ? Vivian Seen    OBJECTIVE DATA SUMMARY:   HISTORY:   Past Medical History:   Diagnosis Date    A-fib ([facility]     Arthritis     Chronic ulcer of left leg ([facility]     H/O tracheostomy     decannulated    Heart murmur     Hemorrhagic stroke ([facility] 1/2015    Ill-defined condition     sickle cell    Intracranial hemorrhage (HCC) 1/2015    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[facility] 1/2015    PEG (percutaneous endoscopic gastrostomy) status ([facility]     removed    Right atrial thrombus     Right sided weakness     from hemorrhagic stroke    Sickle cell disease ([facility]      Past Surgical History:   Procedure Laterality Date    HX CHOLECYSTECTOMY      HX OTHER SURGICAL  1/2015    PEG    HX OTHER SURGICAL      brain hemorrhage evacuation    HX TONSILLECTOMY      HX TRACHEOSTOMY  1/2015       Prior Level of Function/Environment/Context: pt lives with her mother and 3 children (11,14,17).   Pt reports indep. In adls and iADLs, Pt's mother assists with cooking housekeeping etc.  Pt is the . Pt grocery shops and keeps up her home. She considers herself active. Pt reports that she will be moving into a house soon. Occupations in which the patient is/was successful, what are the barriers preventing that success: managing her family and home  Performance Patterns (routines, roles, habits, and rituals): in the community and home ad danielle  Personal Interests and/or values: her children-she is concerned about them while she is hospitalized and if she goes to rehab  Expanded or extensive additional review of patient history: hx sickle cell disease, arthritis, lymphoma of stomach, hemorhagic stroke (affecting R side), pain, LE wound, RLE edema, has care at [address] [place] [address] [place]: Apartment  # Steps to Enter: 0  One/Two Story Residence: One story (2 story building)  Living Alone: No  Support Systems: Child(darlene) (Mom)  Patient Expects to be Discharged to[de-identified] Apartment  Current DME Used/Available at Home: None, Shower chair  Tub or Shower Type: Shower  [x]  Right hand dominant   []  Left hand dominant    EXAMINATION OF PERFORMANCE DEFICITS:  Cognitive/Behavioral Status:     Orientation Level: Oriented to person;Oriented to place;Oriented to situation     Perception: Appears intact  Perseveration: No perseveration noted       Skin: wound on LLE covered in bandage    Edema: RLE     Hearing: Auditory  Auditory Impairment: None    Vision/Perceptual:     mild malignment B eyes                                 Range of Motion:  BUEs:  AROM: Generally decreased, functional, however, tightness noted RUE. LUE                         Strength:  BUEs:   strength is equal bilaterally and is fair. Strength: Generally decreased, functional                Coordination:     Fine Motor Skills-Upper: Left Intact; Right Intact (stiff quality to RUE)    Gross Motor Skills-Upper: Left Intact; Right Intact (RUE stiff quality of movement-pt reports \"jerking' on R side)    Tone & Sensation:  Mildly increased on R  (due to old hemorhagic cva ?)     Sensation: Intact                      Balance:  Sitting: Intact  Standing: Impaired; With support  Standing - Static: Fair  Standing - Dynamic : Fair    Functional Mobility and Transfers for ADLs:  Bed Mobility:  Rolling:  (not assessed-seated upon arrival. enciouraged OOB activity)    Transfers:  Sit to Stand: Minimum assistance  Stand to Sit: Minimum assistance  Toilet Transfer :  (declined)    ADL Assessment:  Feeding: Independent    Oral Facial Hygiene/Grooming: Setup (sitting)    Bathing: Moderate assistance    Upper Body Dressing: Minimum assistance    Lower Body Dressing: Moderate assistance       Meal Preparation:  (pt will need assistance at home for IADLs)             ADL Intervention and task modifications:     Pt educated on role of OT and OT plan of care. Pt is agreeable to OT and is questioning need for rehab. Explained rehab process. Pt declined toilet transfer, but was able to perform Young for sit to and from st anding at chair                                     Therapeutic Exercise:  Encouraged BUE gentle exercises   Functional Measure:  Barthel Index:    Bathin  Bladder: 10  Bowels: 10  Groomin  Dressin  Feeding: 10  Mobility: 0  Stairs: 0  Toilet Use: 5  Transfer (Bed to Chair and Back): 10  Total: 55       Barthel and G-code impairment scale:  Percentage of impairment CH  0% CI  1-19% CJ  20-39% CK  40-59% CL  60-79% CM  80-99% CN  100%   Barthel Score 0-100 100 99-80 79-60 59-40 20-39 1-19   0   Barthel Score 0-20 20 17-19 13-16 9-12 5-8 1-4 0      The Barthel ADL Index: Guidelines  1. The index should be used as a record of what a patient does, not as a record of what a patient could do. 2. The main aim is to establish degree of independence from any help, physical or verbal, however minor and for whatever reason.   3. The need for supervision renders the patient not independent. 4. A patient's performance should be established using the best available evidence. Asking the patient, friends/relatives and nurses are the usual sources, but direct observation and common sense are also important. However direct testing is not needed. 5. Usually the patient's performance over the preceding 24-48 hours is important, but occasionally longer periods will be relevant. 6. Middle categories imply that the patient supplies over 50 per cent of the effort. 7. Use of aids to be independent is allowed. Elpidio Olivares., Barthel, D.W. (4232). Functional evaluation: the Barthel Index. [facility] (14)2. Albert German berto JIM Ho, Jana Goodpasture., Last Agrawal., Blanche, [address] [place] [address] Ave (1999). Measuring the change indisability after inpatient rehabilitation; comparison of the responsiveness of the Barthel Index and Functional Stanley Measure. Journal of Neurology, Neurosurgery, and Psychiatry, 66(4), 511-007. ALMAZ Marquez, MARYSE Joaquin, & Barber Garces M.A. (2004.) Assessment of post-stroke quality of life in cost-effectiveness studies: The usefulness of the Barthel Index and the EuroQoL-5D. Quality of Life Research, 13, 759-58       G codes: In compliance with CMSs Claims Based Outcome Reporting, the following G-code set was chosen for this patient based on their primary functional limitation being treated: The outcome measure chosen to determine the severity of the functional limitation was the BArthel Index with a score of 55/100 which was correlated with the impairment scale. ?  Self Care:     - CURRENT STATUS: CK - 40%-59% impaired, limited or restricted    - GOAL STATUS: CJ - 20%-39% impaired, limited or restricted    - D/C STATUS:  ---------------To be determined---------------     Occupational Therapy Evaluation Charge Determination   History Examination Decision-Making   LOW Complexity : Brief history review  MEDIUM Complexity : 3-5 performance deficits relating to physical, cognitive , or psychosocial skils that result in activity limitations and / or participation restrictions MEDIUM Complexity : Patient may present with comorbidities that affect occupational performnce. Miniml to moderate modification of tasks or assistance (eg, physical or verbal ) with assesment(s) is necessary to enable patient to complete evaluation       Based on the above components, the patient evaluation is determined to be of the following complexity level: LOW   Pain:  Pain Scale 1: Numeric (0 - 10)  Pain Intensity 1: 0  Pain Location 1: Back  Pain Orientation 1: Right;Left  Pain Description 1: Aching;Constant  Pain Intervention(s) 1: Medication (see MAR)  After treatment:   [x] Patient left in no apparent distress sitting up in chair  [] Patient left in no apparent distress in bed  [x] Call bell left within reach  [x] Nursing notified  [] Caregiver present  [x] Bed alarm activated    COMMUNICATION/EDUCATION:   The patients plan of care was discussed with: Registered Nurse.  [] Home safety education was provided and the patient/caregiver indicated understanding. [x] Patient/family have participated as able in goal setting and plan of care. [] Patient/family agree to work toward stated goals and plan of care. [] Patient understands intent and goals of therapy, but is neutral about his/her participation. [] Patient is unable to participate in goal setting and plan of care. This patients plan of care is appropriate for delegation to [facility].     Thank you for this referral.  Mary Beth Haines OTR/L  Time Calculation: 26 mins

## 2018-02-06 NOTE — PROGRESS NOTES
Discussed dcp with patient and her brother. . Discussed snf vs home health. Patient does not want to go to snf. She wants to have home health. She states her mother is there with her when her children are at school and her brother states he will assist. Choice given and she and her brother chose [facility]. Referral sent to them and will await their response.

## 2018-02-06 NOTE — PROGRESS NOTES
2/6/2018 5:18 PM    Admit Date: 2/2/2018    Admit Diagnosis: Sepsis ([facility] [address]    Subjective:     Ethel Tate denies chest pain, chest pressure/discomfort, dyspnea, palpitations, irregular heart beats, near-syncope, syncope.     Visit Vitals    /52 (BP 1 Location: Left arm, BP Patient Position: At rest)    Pulse 69    Temp (!) 49.5 °F (9.7 °C)    Resp 18    Ht 5' 4\" (1.626 m)    Wt 100 lb (45.4 kg)    SpO2 99%    BMI 17.17 kg/m2     Current Facility-Administered Medications   Medication Dose Route Frequency    ciprofloxacin HCl (CIPRO) tablet 500 mg  500 mg Oral Q12H    trimethoprim-sulfamethoxazole (BACTRIM DS, SEPTRA DS) 160-800 mg per tablet 1 Tab  1 Tab Oral Q12H    HYDROmorphone (DILAUDID) injection 1 mg  1 mg IntraVENous Q6H PRN    oxyCODONE IR (ROXICODONE) tablet 15 mg  15 mg Oral Q4H PRN    gabapentin (NEURONTIN) capsule 300 mg  300 mg Oral QHS    rivaroxaban (XARELTO) tablet 20 mg  20 mg Oral DAILY    levalbuterol (XOPENEX) nebulizer soln 1.25 mg/3 mL  1.25 mg Nebulization Q6H PRN    sodium chloride (NS) flush 5-10 mL  5-10 mL IntraVENous Q8H    sodium chloride (NS) flush 5-10 mL  5-10 mL IntraVENous PRN    acetaminophen (TYLENOL) tablet 650 mg  650 mg Oral Q6H PRN    ondansetron (ZOFRAN) injection 4 mg  4 mg IntraVENous Q4H PRN    LORazepam (ATIVAN) injection 1 mg  1 mg IntraVENous C1Y PRN    folic acid (FOLVITE) tablet 1 mg  1 mg Oral DAILY    0.9% sodium chloride infusion 250 mL  250 mL IntraVENous PRN    saline peripheral flush soln 10 mL  10 mL InterCATHeter PRN    SALINE PERIPHERAL FLUSH Q8H soln 10 mL  10 mL InterCATHeter Q8H    amiodarone (CORDARONE) tablet 400 mg  400 mg Oral BID    levETIRAcetam (KEPPRA) tablet 250 mg  250 mg Oral BID         Objective:      Visit Vitals    /52 (BP 1 Location: Left arm, BP Patient Position: At rest)    Pulse 69    Temp (!) 49.5 °F (9.7 °C)    Resp 18    Ht 5' 4\" (1.626 m)    Wt 100 lb (45.4 kg)    SpO2 99%  BMI 17.17 kg/m2       Physical Exam:  Abdomen: soft, non-tender. Bowel sounds normal.   Extremities: no cyanosis or edema  Heart: regular rate and rhythm, S1, S2 normal, no murmur, click, rub or gallop  Lungs: clear to auscultation bilaterally  Neurologic: Grossly normal    Data Review:   Labs:    Recent Results (from the past 24 hour(s))   C REACTIVE PROTEIN, QT    Collection Time: 02/06/18  1:49 AM   Result Value Ref Range    C-Reactive protein 2.65 (H) 0.00 - 0.60 mg/dL   CBC W/O DIFF    Collection Time: 02/06/18  1:49 AM   Result Value Ref Range    WBC 9.9 3.6 - 11.0 K/uL    RBC 2.54 (L) 3.80 - 5.20 M/uL    HGB 6.5 (L) 11.5 - 16.0 g/dL    HCT 18.5 (L) 35.0 - 47.0 %    MCV 72.8 (L) 80.0 - 99.0 FL    MCH 25.6 (L) 26.0 - 34.0 PG    MCHC 35.1 30.0 - 36.5 g/dL    RDW 18.7 (H) 11.5 - 14.5 %    PLATELET 737 963 - 508 K/uL    MPV 10.6 8.9 - 12.9 FL    NRBC 2.1 (H) 0  WBC    ABSOLUTE NRBC 0.21 (H) 0.00 - 8.30 K/uL   METABOLIC PANEL, COMPREHENSIVE    Collection Time: 02/06/18  1:49 AM   Result Value Ref Range    Sodium 146 (H) 136 - 145 mmol/L    Potassium 4.0 3.5 - 5.1 mmol/L    Chloride 117 (H) 97 - 108 mmol/L    CO2 22 21 - 32 mmol/L    Anion gap 7 5 - 15 mmol/L    Glucose 82 65 - 100 mg/dL    BUN 20 6 - 20 MG/DL    Creatinine 0.75 0.55 - 1.02 MG/DL    BUN/Creatinine ratio 27 (H) 12 - 20      GFR est AA >60 >60 ml/min/1.73m2    GFR est non-AA >60 >60 ml/min/1.73m2    Calcium 8.1 (L) 8.5 - 10.1 MG/DL    Bilirubin, total 1.4 (H) 0.2 - 1.0 MG/DL    ALT (SGPT) 30 12 - 78 U/L    AST (SGOT) 71 (H) 15 - 37 U/L    Alk.  phosphatase 149 (H) 45 - 117 U/L    Protein, total 6.3 (L) 6.4 - 8.2 g/dL    Albumin 1.9 (L) 3.5 - 5.0 g/dL    Globulin 4.4 (H) 2.0 - 4.0 g/dL    A-G Ratio 0.4 (L) 1.1 - 2.2         Telemetry: normal sinus rhythm      Assessment:     Active Problems:    Sick[facility] crisis ([facility] (4/21/2015)      Sickle cell anemia (HCC) (4/21/2015)      Sepsis ([facility] (4/21/2015)      Leg wound, left (2/3/2018) Abnormal LFTs (2/3/2018)      A-fib ([facility] [address] (2/3/2018)      Multiple myeloma ([facility] [address] (2/3/2018)      Seizures ([facility] [address] (2/3/2018)        Plan:     1. A. Fib with RVR: remais in sinus rhythm. Continue PO amio. No AC due to h/o ICH. 2. Cardiac MRI findings noted. Will d/w team. Can be addressed as out pt.   3. Sickle cell, anemia: getting BT. Hematology consulted. 4. Renal insuff resolved.     5. Sepsis: per hospitalist. On

## 2018-02-06 NOTE — PROGRESS NOTES
Nutrition Assessment:    INTERVENTIONS/RECOMMENDATIONS:   Meals/Snacks: General/healthful diet: Continue Regular diet  Supplements: Commercial supplement: Decrease ensure to daily    ASSESSMENT:   Patient medically noted for sepsis, sickle cell, multiple myeloma, seizures, and lymphoma of stomach. PMH for stroke. Patient reports a fair appetite at this time. She was distracted during visit. She wasn't on the phone when I entered her room but she made multiple phone calls during visit. She reports liking strawberry flavor of ensure only. She had multiple bottles at bedside, most of which were strawberry. Will decrease to daily for now. Explained menu and room service. Encouraged intake of meals. Diet Order: Regular (Ensure BID)  % Eaten:  No data found. Pertinent Medications: [x] Reviewed []Other: Amiodarone, Folic Acid, keppra, Xarelto  Pertinent Labs: [x]Reviewed  []Other: Na 146, H/H 6.5/18.5  Food Allergies: [x]None []Other:     Last BM: 2/4  []Active     []Hyperactive  []Hypoactive       [] Absent  BS  Skin:    [x] Intact   [] Incision  [] Breakdown   []Edema   []Other:    Anthropometrics: Height: 5' 4\" (162.6 cm) Weight: 45.4 kg (100 lb)    IBW (%IBW):   ( ) UBW (%UBW):   (  %)    BMI: Body mass index is 17.17 kg/(m^2). This BMI is indicative of:  [x]Underweight   []Normal   []Overweight   [] Obesity   [] Extreme Obesity (BMI>40)  Last Weight Metrics:  Weight Loss Metrics 2/5/2018 12/26/2015 4/27/2015 4/21/2015 7/10/2013   Today's Wt 100 lb 105 lb 13.1 oz 97 lb 95 lb 7.4 oz 121 lb 7.6 oz   BMI 17.17 kg/m2 18.16 kg/m2 16.64 kg/m2 16.38 kg/m2 20.84 kg/m2       Estimated Nutrition Needs (Based on): 1667 Kcals/day (BMR (1090) x 1.3AF +250kcal) , 54 g (1.2 g/kg bw) Protein  Carbohydrate:  At Least 130 g/day  Fluids: 1650 mL/day     Pt expected to meet estimated nutrient needs: [x]Yes []No    NUTRITION DIAGNOSES:   Problem:  Underweight      Etiology: related to PMH     Signs/Symptoms: as evidenced by BMI 17.2      NUTRITION INTERVENTIONS:  Meals/Snacks: General/healthful diet   Supplements: Commercial supplement              GOAL:   PO intake >50% of meals and 70% of ONS next 3-5 days    NUTRITION MONITORING AND EVALUATION   Behavioral-Environmental Outcomes: Behavior  Food/Nutrient Intake Outcomes:  Total energy intake  Physical Signs/Symptoms Outcomes: Weight/weight change, Electrolyte and renal profile    Previous Goal Met:   [x] Met              [] Progressing Towards Goal              [] Not Progressing Towards Goal   Previous Recommendations:   [x] Implemented          [] Not Implemented          [] Not Applicable    LEARNING NEEDS (Diet, Food/Nutrient-Drug Interaction):    [x] None Identified   [] Identified and Education Provided/Documented   [] Identified and Pt declined/was not appropriate     Cultural, Yazdanism, OR Ethnic Dietary Needs:    [x] None Identified   [] Identified and Addressed     [x] Interdisciplinary Care Plan Reviewed/Documented    [x] Discharge Planning: Regular diet   [x] Participated in Interdisciplinary Rounds    NUTRITION RISK:    [] High              [x] Moderate           []  Low  []  Minimal/Uncompromised      Jaz Minder  Pager [phone]              Weekend Pager [phone]

## 2018-02-06 NOTE — INTERDISCIPLINARY ROUNDS
Interdisciplinary team rounds were held 2/6/2018 with the following team members:Care Management, Nursing, Nutrition, Pharmacy and Physician and the patient. Plan of care discussed.  See clinical pathway and/or care plan for interventions and desired outco

## 2018-02-06 NOTE — PROGRESS NOTES
CM Initial Assessment        PMHX--  Significant for sickle cell disease, arthritis, atrial fib, hemorrhagic stroke. Current admission assessment--  Patient came to ed for evaluation during an active seizure. Per EMS patient was postical with right sided deficit and slurred speech. Patient had episode atrial fib en route to ed. Spoke with patient today and she states she lives with her mother and three children. She states she uses oxygen 2 liters at home. She states she has used home health in the past and she thought the name was [facility]. She gets her prescriptions [facility] at [facility].  She states she sees Dr Isaak Pan at [facility] for her sickle cell disease. Called Dr Jarrod Gabriel office and spoke with the  Mary and she states patient last saw Dr Jarrod Gabriel in 2015. She states the patient also sees Dr Bradley Gibbs at [facility] for her sickle cell. Per  states the mds rotate. She last saw Dr Bradley Gibbs in August 2017 and saw the nurse practitioner Dec 2017 for her sickle cell. Patient states she does not have pcp and would like one. She has an appointment to see Dr Jocelyne Garcia, her new pcp, on Feb 8th at 215pm at [facility] [address]. Patient made aware and information placed on AVS.  Discussed dcp with patient and attempted to call her emergency contact however recording stating this number is not accepting calls at this time. Awaiting physical and occupational therapy to see patient today. Presently patient down for testing. Care Management Interventions  PCP Verified by CM: Yes  Discharge Durable Medical Equipment:  (Patient has walker at home, portable oxygen.)  Physical Therapy Consult: Yes  Occupational Therapy Consult: Yes  Current Support Network:  Other (Patient states she lives with her mother and three children. )  Confirm Follow Up Transport: Family  Plan discussed with Pt/Family/Caregiver: Yes  Discharge Location  Discharge Placement: Home [facility] [id number] EXT [id number]

## 2018-02-06 NOTE — PROCEDURES
Patient Name: Toni Granger  : 1975  Age: 36 y.o. Ordering physician: No ref. provider found  Date of EE2018  EEG procedure number: [id number]  Diagnosis: Seizure  Interpreting physician: Justin Meredith MD    ELECTROENCEPHALOGRAM REPORT     PROCEDURE: EEG. CLINICAL INDICATION: The patient is a 36 y.o. female with a history of possible seizures. EEG to rule out seizures, rule out stroke, rule out cortical abnormality. EEG CLASSIFICATION: Abnormal     DESCRIPTION OF THE RECORD:   The background of this recording contains a posteriorly-located occipital alpha rhythm of 5 Hz that attenuates with eye opening. Throughout the recording, there were no clear areas of focal slowing nor spike or spike-and-wave discharges seen. Hyperventilation was not performed. Photic stimulation produced no response. During the recording the patient did not achieve stage II sleep    INTERPRETATION:   Abnormal. Moderate diffuse cerebral dysfunction which is non specific for etiology but can be seen in toxic/metabolic states. No seizures. Clinical correlation recommended.       Justin Meredith MD  Neurologist

## 2018-02-06 NOTE — PROGRESS NOTES
Physical therapy:    Orders received and chart reviewed. Attempted PT evaluation, however patient off the floor for testing. Will defer and continue to follow.  Thank you    Maria Victoria De La Vega, PT, DPT

## 2018-02-06 NOTE — PROGRESS NOTES
Occupational Therapy  Orders received and medical record reviewed. Pt is off the floor at this time. Will defer initiating OT Evaluation at this time and continue to follow.

## 2018-02-06 NOTE — PROGRESS NOTES
Hospitalist Progress Note    NAME: Toni Granger   :  1975   MRN:  [id number]       Assessment / Plan:  Septic shock due to left leg infection in setting of chronic LLE wound, present on admission:  Low blood pressure and hypothermia on presentation, resolved. Initially on levophed. - CXR with mild pulmonary edema pattern  - wound cultures heavy sensitive pseudomonas and heavy quinolone resistant MSSA  - started cipro, bactrim based on sensitivities. Stop emperic IV vancomycin, cefepime and flagyl.  - wound care: Cleanse the left lower leg wound with NS, wiping the wound bed with 4x4's to remove the old drainage. Apply a Xeroform dressing and cover with an ABD and wrap with Miller    Possible seizures with h/o ICH and remote CVA:  None since admission, no history of seizure.  Per family seizure lasted a few seconds, after Pt was post-ictal with right sided deficits and slurred speech per EMS. - CT head with no acute intracranial process. 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 - neuro consult appreciated  - EEG: Abnormal. Moderate diffuse cerebral dysfunction which is non specific for etiology but can be seen in toxic/metabolic states. No seizures. Clinical correlation recommended. - con't low-dose keppra with prn ativan for now - plan to dc on Keppra 250mg BID as per Neuro    Afib with RVR and history of R atrial mass: converted to NSR shortly after admission  - echo with EF 60-65%. There were no regional wall motion abnormalities.   - con't oral amiodarone and home xarelto  - appreciate cardiology consult  - cardiac MRI pending    YEN:  Improving Cr, but not at baseline (baseline looks Cr ~0.4)  - UA on admission benign  - will try off IVF  - had urinary retention (350mL) on admission, jose d/c'd /    Sickle cell crisis with severe anemia:  Hg slightly lower today  - H/H at low end of acceptable defined as 6.5 to 7.0 range  - transfused 3u pRBCs this admission  - appreciate hematology consult  - still holding home high-dose MS contin as Pt became extremely lethargic with lower blood pressure after receiving here. Con't home oxycodone at half her usual dose. Abnormal LFTs, imroving: suspect due to sepsis and sickling, con't to monitor  Lymphoma of the stomach:  Follows at [facility] Jhonatan Quintero), plans for LN biopsy in the near future, but not currently on treatment  - RLE doppler 2/4 with no DVT    Underweight (Body mass index is 17.17 kg/(m^2)  Code Status: Full    DVT Prophylaxis: Lovenox     Subjective:     Chief Complaint / Reason for Physician Visit  \"Doing better\". Discussed with RN events overnight. Review of Systems:  Symptom Y/N Comments  Symptom Y/N Comments   Fever/Chills n   Chest Pain n    Poor Appetite n   Edema y    Cough n   Abdominal Pain n    Sputum    Joint Pain     SOB/DELGADO n   Pruritis/Rash     Nausea/vomit n   Tolerating PT/OT     Diarrhea    Tolerating Diet     Constipation    Other       Could NOT obtain due to:      Objective:     VITALS:   Last 24hrs VS reviewed since prior progress note. Most recent are:  Patient Vitals for the past 24 hrs:   Temp Pulse Resp BP SpO2   02/06/18 0713 97.8 °F (36.6 °C) 62 17 107/69 100 %   02/06/18 0231 98 °F (36.7 °C) 66 16 118/73 100 %   02/05/18 2249 97.8 °F (36.6 °C) 72 16 126/77 100 %   02/05/18 1933 97.8 °F (36.6 °C) 68 16 115/65 100 %   02/05/18 1618 97.7 °F (36.5 °C) 67 16 126/72 99 %       Intake/Output Summary (Last 24 hours) at 02/06/18 1426  Last data filed at 02/06/18 0601   Gross per 24 hour   Intake                0 ml   Output              600 ml   Net             -600 ml        PHYSICAL EXAM:  General: WD, WN. Alert, cooperative, no acute distress    EENT:  EOMI. Anicteric sclerae. MMM  Resp:  CTA bilaterally, no wheezing or rales.   No accessory muscle use  CV:  Regular  rhythm,  3+ RLE edema  GI:  Soft, Non distended, Non tender.  +Bowel sounds  Neurologic:  Alert and oriented X 3, normal speech,   Psych:   Poor insight. Not anxious nor agitated  Skin:  No rashes. No jaundice    Reviewed most current lab test results and cultures  YES  Reviewed most current radiology test results   YES  Review and summation of old records today    NO  Reviewed patient's current orders and MAR    YES  PMH/SH reviewed - no change compared to H&P  ________________________________________________________________________  Care Plan discussed with:    Comments   Patient x    Family      RN x    Care Manager x    Consultant                        Multidiciplinary team rounds were held today with , nursing, pharmacist and clinical coordinator. Patient's plan of care was discussed; medications were reviewed and discharge planning was addressed. ________________________________________________________________________  Total NON critical care TIME:  25   Minutes    Total CRITICAL CARE TIME Spent:   Minutes non procedure based      Comments   >50% of visit spent in counseling and coordination of care     ________________________________________________________________________  Anjali Orantes MD     Procedures: see electronic medical records for all procedures/Xrays and details which were not copied into this note but were reviewed prior to creation of Plan. LABS:  I reviewed today's most current labs and imaging studies.   Pertinent labs include:  Recent Labs      02/06/18   0149  02/05/18   0236  02/04/18   0240   WBC  9.9  7.2  13.4*   HGB  6.5*  6.4*  7.0*   HCT  18.5*  19.0*  20.2*   PLT  368  335  358     Recent Labs      02/06/18   0149  02/05/18   0236  02/04/18   0240   NA  146*  147*  144   K  4.0  4.1  4.1   CL  117*  117*  115*   CO2  22  21  23   GLU  82  79  89   BUN  20  23*  30*   CREA  0.75  0.70  1.01   CA  8.1*  7.7*  7.5*   ALB  1.9*  1.9*  2.0*   TBILI  1.4*  1.5*  2.0*   SGOT  71*  87*  116*   ALT  30  32  40       Signed: Anjali Orantes MD

## 2018-02-06 NOTE — PROGRESS NOTES
PCU SHIFT NURSING NOTE      Bedside shift change report given to Giles Healy RN (oncoming nurse) by Paco Coker RN (offgoing nurse). Report included the following information SBAR, Kardex, Intake/Output, Recent Results and Cardiac Rhythm NSR. Shift Summary:   1930: Assessment complete. Patient drowsy but alert. Complains of 7/10 leg pain. Informed patient medication is not due at this time. Call bell in reach. Will monitor   2146:PRN Joy administered at this time   0145: Patient assisted to the bedside commode. PRV 116ml  0140: PRN Joy administered  0730: Uneventful night. Report given to [facility]. Call bell in reach      Admission Date 2/2/2018   Admission Diagnosis Sepsis ([address]   Consults IP CONSULT TO NEUROLOGY  IP CONSULT TO HEMATOLOGY  IP CONSULT TO CARDIOLOGY        Consults   []PT   []OT   []Speech   []Case Management      [] Palliative      Cardiac Monitoring Order   []Yes   []No     IV drips   []Yes    Drip:                            Dose:  Drip:                            Dose:  Drip:                            Dose:   []No     GI Prophylaxis   []Yes   []No         DVT Prophylaxis   SCDs:             Nico stockings:         [] Medication   []Contraindicated   []None      Activity Level Activity Level: Up with Assistance     Activity Assistance: Partial (one person)   Purposeful Rounding every 1-2 hour? []Yes   Goldstein Score  Total Score: 3   Bed Alarm (If score 3 or >)   []Yes   [] Refused (See signed refusal form in chart)   Shashi Score  Shashi Score: 17   Shashi Score (if score 14 or less)   []PMT consult   []Wound Care consult      []Specialty bed   [] Nutrition consult          Needs prior to discharge:   Home O2 required:    []Yes   []No    If yes, how much O2 required?     Other:    Last Bowel Movement: Last Bowel Movement Date: 02/04/18      Influenza Vaccine Received Flu Vaccine for Current Season (usually Sept-March): Yes        Pneumonia Vaccine           Diet Active Orders   Diet    DIET REGULAR      LDAs             Venous Access Device Porticath 02/02/18 Upper chest (subclavicular area, right (Active)   Central Line Being Utilized Yes 2/5/2018  7:33 PM   Criteria for Appropriate Use Limited/no vessel suitable for conventional peripheral access 2/5/2018  7:33 PM   Site Assessment Clean, dry, & intact 2/5/2018  7:33 PM   Date of Last Dressing Change 02/03/18 2/5/2018  3:34 AM   Dressing Status Clean, dry, & intact 2/5/2018  7:33 PM   Dressing Type Disk with Chlorhexadine gluconate (CHG); Tape;Transparent 2/5/2018  7:33 PM   Action Taken Open ports on tubing capped 2/5/2018  7:33 PM   Alcohol Cap Used Yes 2/5/2018  7:33 PM      Peripheral IV 02/02/18 Left Arm (Active)   Site Assessment Clean, dry, & intact 2/5/2018  7:33 PM   Phlebitis Assessment 0 2/5/2018  7:33 PM   Infiltration Assessment 0 2/5/2018  7:33 PM   Dressing Status Clean, dry, & intact 2/5/2018  7:33 PM   Dressing Type Disk with Chlorhexadine gluconate (CHG); Tape;Transparent 2/5/2018  7:33 PM   Hub Color/Line Status Pink;Capped;Flushed 2/5/2018  7:33 PM   Action Taken Open ports on tubing capped 2/5/2018  7:33 PM   Alcohol Cap Used Yes 2/5/2018  7:33 PM                      Urinary Catheter [REMOVED] Urinary Catheter 02/03/18 Martinez-Criteria for Appropriate Use: Strict I/Os, Medically/surgically unstable    Intake & Output   Date 02/04/18 1900 - 02/05/18 0659 02/05/18 0700 - 02/06/18 0659   Shift 7252-0623 24 Hour Total 8373-3849 0108-2734 24 Hour Total   I  N  T  A  K  E   I.V.  (mL/kg/hr) 1216.7 2376.7         Volume (0.9% sodium chloride infusion) 866.7 2026. 7         Volume (metroNIDAZOLE (FLAGYL) IVPB premix 500 mg) 100 100         Volume (vancomycin (VANCOCIN) 750 mg in 0.9% sodium chloride (MBP/ADV) 250 mL) 250 250       Shift Total  (mL/kg) 1216.7  (26.8) 2376.7  (52.4)      O  U  T  P  U  T   Urine  (mL/kg/hr)  1250         Urine Occurrence(s)   4 x  4 x      Urine Output (mL) ([REMOVED] Urinary Catheter 02/03/18 Martinez)  1250 Shift Total  (mL/kg)  1250  (27.6)      NET 1216.7 1126.7      Weight (kg) 45.4 45.4 45.4 45.4 45.4         Readmission Risk Assessment Tool Score Low Risk            12       Total Score        3 Has Seen PCP in Last 6 Months (Yes=3, No=0)    9 Pt. Coverage (Medicare=5 , Medicaid, or Self-Pay=4)        Criteria that do not apply:    . Living with Significant Other. Assisted Living. LTAC. SNF.  or   Rehab    Patient Length of Stay (>5 days = 3)    IP Visits Last 12 Months (1-3=4, 4=9, >4=11)    Charlson Comorbidity Score (Age + Comorbid Conditions)       Expected Length of Stay 4d 21h   Actual Length of Stay 2

## 2018-02-06 NOTE — ROUTINE PROCESS
Bedside shift change report given to [facility] HEART, RN (oncoming nurse) by Anh Ledezma RN (offgoing nurse). Report included the following information SBAR, Kardex, Procedure Summary, Intake/Output, MAR, Accordion, Recent Results, Med Rec Status, Cardiac Rhythm SB/SR and Alarm Parameters .

## 2018-02-07 LAB
ANION GAP SERPL CALC-SCNC: 7 MMOL/L (ref 5–15)
BASOPHILS # BLD: 0 K/UL (ref 0–0.1)
BASOPHILS NFR BLD: 0 % (ref 0–1)
BUN SERPL-MCNC: 18 MG/DL (ref 6–20)
BUN/CREAT SERPL: 24 (ref 12–20)
CALCIUM SERPL-MCNC: 8.2 MG/DL (ref 8.5–10.1)
CHLORIDE SERPL-SCNC: 113 MMOL/L (ref 97–108)
CO2 SERPL-SCNC: 22 MMOL/L (ref 21–32)
CREAT SERPL-MCNC: 0.76 MG/DL (ref 0.55–1.02)
DIFFERENTIAL METHOD BLD: ABNORMAL
EOSINOPHIL # BLD: 0.3 K/UL (ref 0–0.4)
EOSINOPHIL NFR BLD: 2 % (ref 0–7)
ERYTHROCYTE [DISTWIDTH] IN BLOOD BY AUTOMATED COUNT: 19.8 % (ref 11.5–14.5)
GLUCOSE SERPL-MCNC: 70 MG/DL (ref 65–100)
HCT VFR BLD AUTO: 19 % (ref 35–47)
HGB BLD-MCNC: 6.6 G/DL (ref 11.5–16)
IMM GRANULOCYTES # BLD: 0.1 K/UL (ref 0–0.04)
IMM GRANULOCYTES NFR BLD AUTO: 1 % (ref 0–0.5)
LYMPHOCYTES # BLD: 1.7 K/UL (ref 0.8–3.5)
LYMPHOCYTES NFR BLD: 13 % (ref 12–49)
MCH RBC QN AUTO: 25.3 PG (ref 26–34)
MCHC RBC AUTO-ENTMCNC: 34.7 G/DL (ref 30–36.5)
MCV RBC AUTO: 72.8 FL (ref 80–99)
MONOCYTES # BLD: 1 K/UL (ref 0–1)
MONOCYTES NFR BLD: 8 % (ref 5–13)
NEUTS SEG # BLD: 9.9 K/UL (ref 1.8–8)
NEUTS SEG NFR BLD: 76 % (ref 32–75)
NRBC # BLD: 0.19 K/UL (ref 0–0.01)
NRBC BLD-RTO: 1.5 PER 100 WBC
PLATELET # BLD AUTO: 399 K/UL (ref 150–400)
PMV BLD AUTO: 10.8 FL (ref 8.9–12.9)
POTASSIUM SERPL-SCNC: 3.7 MMOL/L (ref 3.5–5.1)
RBC # BLD AUTO: 2.61 M/UL (ref 3.8–5.2)
RBC MORPH BLD: ABNORMAL
SODIUM SERPL-SCNC: 142 MMOL/L (ref 136–145)
WBC # BLD AUTO: 13 K/UL (ref 3.6–11)

## 2018-02-07 PROCEDURE — 74011250637 HC RX REV CODE- 250/637: Performed by: INTERNAL MEDICINE

## 2018-02-07 PROCEDURE — 65660000000 HC RM CCU STEPDOWN

## 2018-02-07 PROCEDURE — 80048 BASIC METABOLIC PNL TOTAL CA: CPT | Performed by: GENERAL ACUTE CARE HOSPITAL

## 2018-02-07 PROCEDURE — 74011250636 HC RX REV CODE- 250/636: Performed by: GENERAL ACUTE CARE HOSPITAL

## 2018-02-07 PROCEDURE — 85025 COMPLETE CBC W/AUTO DIFF WBC: CPT | Performed by: GENERAL ACUTE CARE HOSPITAL

## 2018-02-07 PROCEDURE — 77030011943

## 2018-02-07 PROCEDURE — 74011250637 HC RX REV CODE- 250/637: Performed by: GENERAL ACUTE CARE HOSPITAL

## 2018-02-07 PROCEDURE — 74011000250 HC RX REV CODE- 250: Performed by: GENERAL ACUTE CARE HOSPITAL

## 2018-02-07 PROCEDURE — 74011250636 HC RX REV CODE- 250/636: Performed by: INTERNAL MEDICINE

## 2018-02-07 PROCEDURE — 36415 COLL VENOUS BLD VENIPUNCTURE: CPT | Performed by: GENERAL ACUTE CARE HOSPITAL

## 2018-02-07 PROCEDURE — 74011250636 HC RX REV CODE- 250/636: Performed by: EMERGENCY MEDICINE

## 2018-02-07 PROCEDURE — 74011250637 HC RX REV CODE- 250/637: Performed by: PSYCHIATRY & NEUROLOGY

## 2018-02-07 RX ORDER — CLINDAMYCIN HYDROCHLORIDE 150 MG/1
300 CAPSULE ORAL EVERY 8 HOURS
Status: DISCONTINUED | OUTPATIENT
Start: 2018-02-07 | End: 2018-02-08 | Stop reason: HOSPADM

## 2018-02-07 RX ADMIN — ONDANSETRON HYDROCHLORIDE 4 MG: 2 INJECTION, SOLUTION INTRAMUSCULAR; INTRAVENOUS at 13:32

## 2018-02-07 RX ADMIN — CIPROFLOXACIN HYDROCHLORIDE 500 MG: 500 TABLET, FILM COATED ORAL at 22:06

## 2018-02-07 RX ADMIN — Medication 10 ML: at 22:06

## 2018-02-07 RX ADMIN — OXYCODONE HYDROCHLORIDE 15 MG: 5 TABLET ORAL at 21:29

## 2018-02-07 RX ADMIN — FOLIC ACID 1 MG: 1 TABLET ORAL at 09:29

## 2018-02-07 RX ADMIN — HYDROMORPHONE HYDROCHLORIDE 1 MG: 2 INJECTION INTRAMUSCULAR; INTRAVENOUS; SUBCUTANEOUS at 19:45

## 2018-02-07 RX ADMIN — AMIODARONE HYDROCHLORIDE 400 MG: 200 TABLET ORAL at 17:26

## 2018-02-07 RX ADMIN — CLINDAMYCIN HYDROCHLORIDE 300 MG: 150 CAPSULE ORAL at 22:06

## 2018-02-07 RX ADMIN — OXYCODONE HYDROCHLORIDE 15 MG: 5 TABLET ORAL at 02:15

## 2018-02-07 RX ADMIN — AMIODARONE HYDROCHLORIDE 400 MG: 200 TABLET ORAL at 09:29

## 2018-02-07 RX ADMIN — GABAPENTIN 300 MG: 300 CAPSULE ORAL at 22:05

## 2018-02-07 RX ADMIN — Medication 10 ML: at 13:33

## 2018-02-07 RX ADMIN — ONDANSETRON HYDROCHLORIDE 4 MG: 2 INJECTION, SOLUTION INTRAMUSCULAR; INTRAVENOUS at 09:39

## 2018-02-07 RX ADMIN — ONDANSETRON HYDROCHLORIDE 4 MG: 2 INJECTION, SOLUTION INTRAMUSCULAR; INTRAVENOUS at 22:11

## 2018-02-07 RX ADMIN — OXYCODONE HYDROCHLORIDE 15 MG: 5 TABLET ORAL at 17:26

## 2018-02-07 RX ADMIN — Medication 10 ML: at 06:36

## 2018-02-07 RX ADMIN — LEVETIRACETAM 250 MG: 250 TABLET, FILM COATED ORAL at 17:28

## 2018-02-07 RX ADMIN — ONDANSETRON HYDROCHLORIDE 4 MG: 2 INJECTION, SOLUTION INTRAMUSCULAR; INTRAVENOUS at 04:00

## 2018-02-07 RX ADMIN — RIVAROXABAN 20 MG: 20 TABLET, FILM COATED ORAL at 09:29

## 2018-02-07 RX ADMIN — OXYCODONE HYDROCHLORIDE 15 MG: 5 TABLET ORAL at 10:35

## 2018-02-07 RX ADMIN — PROCHLORPERAZINE EDISYLATE 5 MG: 5 INJECTION INTRAMUSCULAR; INTRAVENOUS at 17:24

## 2018-02-07 RX ADMIN — SULFAMETHOXAZOLE AND TRIMETHOPRIM 1 TABLET: 800; 160 TABLET ORAL at 09:29

## 2018-02-07 RX ADMIN — CIPROFLOXACIN HYDROCHLORIDE 500 MG: 500 TABLET, FILM COATED ORAL at 09:29

## 2018-02-07 RX ADMIN — LEVETIRACETAM 250 MG: 250 TABLET, FILM COATED ORAL at 09:29

## 2018-02-07 NOTE — ROUTINE PROCESS
Bedside shift change report given to [facility] HEART, RN (oncoming nurse) by Hattie Costello RN (offgoing nurse). Report included the following information SBAR, Kardex, Procedure Summary, Intake/Output, MAR, Accordion, Recent Results, Med Rec Status, Cardiac Rhythm SB/SR and Alarm Parameters .

## 2018-02-07 NOTE — PROGRESS NOTES
PCU SHIFT NURSING NOTE      Bedside shift change report given to [employer] RN (oncoming nurse) by Alonzo Owens RN (offgoing nurse). Report included the following information SBAR, Kardex, Intake/Output, Recent Results and Cardiac Rhythm NSR. Shift Summary:   1930: Assessment complete. Patient assisted to bedside commode. Generalized pain complaints. No nausea voiced at this time. Patient encouraged to use call bell for assistance when ambulating. Refusing bed alarm at this time. VSS. Will monitor   2202: PRN Joy administered for pain   0028: Patient ambulating with assistance to bedside commode  0330: Patient assisted to bedside commode. Patient voicing concerns of not emptying her bladder completely. PRN Joy administered  0345: Bladder scan showing patient retaining 220ml. MD paged  0400: PRN Zofran administered for nausea and vomiting   0405: Spoke with Dr. Ti Limon. Orders received for one time straight cath  0420: Advised patient orders were received from the on call physician to straight cath. Patient refusing stating \"I don't want that, just let it be\". Will monitor   0545: Patient up to commode   0715: Report given to [facility]. Call bell in reach         Admission Date 2/2/2018   Admission Diagnosis Sepsis ([address]   Consults IP CONSULT TO NEUROLOGY  IP CONSULT TO HEMATOLOGY  IP CONSULT TO CARDIOLOGY        Consults   []PT   []OT   []Speech   []Case Management      [] Palliative      Cardiac Monitoring Order   []Yes   []No     IV drips   []Yes    Drip:                            Dose:  Drip:                            Dose:  Drip:                            Dose:   []No     GI Prophylaxis   []Yes   []No         DVT Prophylaxis   SCDs:             Nico stockings:         [] Medication   []Contraindicated   []None      Activity Level Activity Level: Up with Assistance     Activity Assistance: Partial (one person)   Purposeful Rounding every 1-2 hour?    []Yes   Goldstein Score  Total Score: 3   Bed Alarm (If score 3 or >) []Yes   [] Refused (See signed refusal form in chart)   Shashi Score  Shashi Score: 17   Shashi Score (if score 14 or less)   []PMT consult   []Wound Care consult      []Specialty bed   [] Nutrition consult          Needs prior to discharge:   Home O2 required:    []Yes   []No    If yes, how much O2 required? Other:    Last Bowel Movement: Last Bowel Movement Date: 02/06/18      Influenza Vaccine Received Flu Vaccine for Current Season (usually Sept-March): Yes        Pneumonia Vaccine           Diet Active Orders   Diet    DIET REGULAR      LDAs             Venous Access Device Porticath 02/02/18 Upper chest (subclavicular area, right (Active)   Central Line Being Utilized Yes 2/6/2018  2:00 PM   Criteria for Appropriate Use Limited/no vessel suitable for conventional peripheral access 2/6/2018  2:00 PM   Site Assessment Clean, dry, & intact 2/6/2018  2:00 PM   Date of Last Dressing Change 02/02/18 2/6/2018  3:57 AM   Dressing Status Clean, dry, & intact 2/6/2018  2:00 PM   Dressing Type Disk with Chlorhexadine gluconate (CHG); Tape;Transparent 2/6/2018  2:00 PM   Action Taken Open ports on tubing capped 2/6/2018  7:13 AM   Alcohol Cap Used Yes 2/6/2018  7:13 AM      Peripheral IV 02/02/18 Left Arm (Active)   Site Assessment Clean, dry, & intact 2/6/2018  2:00 PM   Phlebitis Assessment 0 2/6/2018  2:00 PM   Infiltration Assessment 0 2/6/2018  2:00 PM   Dressing Status Clean, dry, & intact 2/6/2018  2:00 PM   Dressing Type Tape;Transparent 2/6/2018  2:00 PM   Hub Color/Line Status Pink;Flushed;Patent;Capped 2/6/2018  7:13 AM   Action Taken Open ports on tubing capped 2/6/2018  7:13 AM   Alcohol Cap Used Yes 2/6/2018  7:13 AM                      Urinary Catheter [REMOVED] Urinary Catheter 02/03/18 Martinez-Criteria for Appropriate Use: Strict I/Os, Medically/surgically unstable    Intake & Output   Date 02/05/18 1900 - 02/06/18 0659 02/06/18 0700 - 02/07/18 0659   Shift 7717-6089 24 Hour Total 8607-0424-4841 2747-6182 24 Hour Total   I  N  T  A  K  E   Shift Total  (mL/kg)        O  U  T  P  U  T   Urine  (mL/kg/hr) 600 600  100 100      Urine Voided 600 600  100 100      Urine Occurrence(s)  4 x 2 x  2 x    Stool           Stool Occurrence(s)   1 x  1 x    Shift Total  (mL/kg) 600  (13.2) 600  (13.2)  100  (2.2) 100  (2.2)   NET -600 -600  -100 -100   Weight (kg) 45.4 45.4 45.4 45.4 45.4         Readmission Risk Assessment Tool Score Low Risk            12       Total Score        3 Has Seen PCP in Last 6 Months (Yes=3, No=0)    9 Pt. Coverage (Medicare=5 , Medicaid, or Self-Pay=4)        Criteria that do not apply:    . Living with Significant Other. Assisted Living. LTAC. SNF.  or   Rehab    Patient Length of Stay (>5 days = 3)    IP Visits Last 12 Months (1-3=4, 4=9, >4=11)    Charlson Comorbidity Score (Age + Comorbid Conditions)       Expected Length of Stay 4d 21h   Actual Length of Stay 3

## 2018-02-07 NOTE — PROGRESS NOTES
PCU SHIFT NURSING NOTE      Bedside shift change report given to Otis Abebe (oncoming nurse) by Evelyn Altamirano (offgoing nurse). Report included the following information SBAR, Kardex, Intake/Output, MAR and Recent Results. Shift Summary: 0730  Pt assisted to bedside commode, nausea/vomiting x2 this am  On contact for Staph in LLE  NSR, RA  R chest port and R midline in place  Call bell in reach    1045  Pt with another episode of vomiting   Back in bed    1345  Vomiting - zofran administered    1656  Pt with another episode of emesis  This is new onset per patient as of yesterday  Discussed medications - started Bactrim and Cipro PO 2/5 in the evening and nausea/vomiting began 2/6 in the am.  Pt states \"bactrim makes me sick\" . Paged Dr Alethea Shafer orders rec'd   1800  Wound care done        Admission Date 2/2/2018   Admission Diagnosis Sepsis ([facility] [address]   Consults IP CONSULT TO NEUROLOGY  IP CONSULT TO HEMATOLOGY  IP CONSULT TO CARDIOLOGY        Consults   []PT   []OT   []Speech   []Case Management      [] Palliative      Cardiac Monitoring Order   []Yes   []No     IV drips   []Yes    Drip:                            Dose:  Drip:                            Dose:  Drip:                            Dose:   []No     GI Prophylaxis   []Yes   []No         DVT Prophylaxis   SCDs:             Nico stockings:         [] Medication   []Contraindicated   []None      Activity Level Activity Level: Up with Assistance     Activity Assistance: Partial (one person)   Purposeful Rounding every 1-2 hour? []Yes   Goldstein Score  Total Score: 3   Bed Alarm (If score 3 or >)   []Yes   [] Refused (See signed refusal form in chart)   Shashi Score  Shashi Score: 16   Shashi Score (if score 14 or less)   []PMT consult   []Wound Care consult      []Specialty bed   [] Nutrition consult          Needs prior to discharge:   Home O2 required:    []Yes   []No    If yes, how much O2 required?     Other:    Last Bowel Movement: Last Bowel Movement Date: 02/06/18      Influenza Vaccine Received Flu Vaccine for Current Season (usually Sept-March): Yes        Pneumonia Vaccine           Diet Active Orders   Diet    DIET REGULAR      LDAs             Venous Access Device Porticath 02/02/18 Upper chest (subclavicular area, right (Active)   Central Line Being Utilized Yes 2/7/2018  3:30 AM   Criteria for Appropriate Use Limited/no vessel suitable for conventional peripheral access 2/7/2018  3:30 AM   Site Assessment Clean, dry, & intact 2/7/2018  3:30 AM   Date of Last Dressing Change 02/02/18 2/7/2018  3:30 AM   Dressing Status Clean, dry, & intact 2/7/2018  3:30 AM   Dressing Type Disk with Chlorhexadine gluconate (CHG); Tape;Transparent 2/7/2018  3:30 AM   Action Taken Open ports on tubing capped 2/7/2018  3:30 AM   Alcohol Cap Used Yes 2/7/2018  3:30 AM      Peripheral IV 02/02/18 Left Arm (Active)   Site Assessment Clean, dry, & intact 2/7/2018  3:30 AM   Phlebitis Assessment 0 2/7/2018  3:30 AM   Infiltration Assessment 0 2/7/2018  3:30 AM   Dressing Status Clean, dry, & intact 2/7/2018  3:30 AM   Dressing Type Disk with Chlorhexadine gluconate (CHG); Tape;Transparent 2/7/2018  3:30 AM   Hub Color/Line Status Pink;Capped;Flushed 2/7/2018  3:30 AM   Action Taken Open ports on tubing capped 2/7/2018  3:30 AM   Alcohol Cap Used Yes 2/7/2018  3:30 AM                      Urinary Catheter [REMOVED] Urinary Catheter 02/03/18 Martinez-Criteria for Appropriate Use: Strict I/Os, Medically/surgically unstable    Intake & Output   Date 02/06/18 0700 - 02/07/18 0659 02/07/18 0700 - 02/08/18 0659   Shift 4632-7840 8646-8094 24 Hour Total 3600-3681 5122-3383 24 Hour Total   I  N  T  A  K  E   Shift Total  (mL/kg)         O  U  T  P  U  T   Urine  (mL/kg/hr)  1100  (2) 1100  (1)         Urine Voided  1100 1100         Urine Occurrence(s) 2 x  2 x       Stool            Stool Occurrence(s) 1 x  1 x       Shift Total  (mL/kg)  1100  (24.3) 1100  (24.3)      NET  -1100 -1100      Weight (kg) 45.4 45.4 45.4 45.4 45.4 45.4         Readmission Risk Assessment Tool Score Low Risk            12       Total Score        3 Has Seen PCP in Last 6 Months (Yes=3, No=0)    9 Pt. Coverage (Medicare=5 , Medicaid, or Self-Pay=4)        Criteria that do not apply:    . Living with Significant Other. Assisted Living. LTAC. SNF.  or   Rehab    Patient Length of Stay (>5 days = 3)    IP Visits Last 12 Months (1-3=4, 4=9, >4=11)    Charlson Comorbidity Score (Age + Comorbid Conditions)       Expected Length of Stay 4d 21h   Actual Length of Stay 4

## 2018-02-07 NOTE — PROGRESS NOTES
[facility] has accepted patient when medically stable. Patient is aware and in agreement.

## 2018-02-07 NOTE — PROGRESS NOTES
Hospitalist Progress Note    NAME: Noelle Villalpando   :  1975   MRN:  [id number]       Assessment / Plan:  Septic shock due to left leg infection in setting of chronic LLE wound, present on admission:  Low blood pressure and hypothermia on presentation, resolved.  Initially on levophed. - CXR with mild pulmonary edema pattern  - wound cultures heavy sensitive pseudomonas and heavy quinolone resistant MSSA  - started cipro, bactrim based on sensitivities.  Stopped emperic IV vancomycin, cefepime and flagyl.  - wound care: Cleanse the left lower leg wound with NS, wiping the wound bed with 4x4's to remove the old drainage. Apply a Xeroform dressing and cover with an ABD and wrap with Miller    Nausea, vomiting  -Anti-emetics prn     Possible seizures with h/o ICH and remote CVA:  None since admission, no history of seizure.  Per family seizure lasted a few seconds, after Pt was post-ictal with right sided deficits and slurred speech per EMS. - CT head with no acute intracranial process. 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 - neuro consult appreciated  - EEG: Abnormal. Moderate diffuse cerebral dysfunction which is non specific for etiology but can be seen in toxic/metabolic states. No seizures. Clinical correlation recommended. - con't low-dose keppra with prn ativan for now - plan to dc on Keppra 250mg BID as per Neuro     Afib with RVR and history of R atrial mass: converted to NSR shortly after admission  - echo with EF 60-65%. There were no regional wall motion abnormalities.   - con't oral amiodarone - pt was restarted on Xarelto upon admission here - however due to history of ICH, Cardio rec is to discontinue it  - Appreciate cardiology consult  - cardiac MRI completed - pt to follow up with results with Cardiology at outpatient     YEN, resolved     Sickle cell crisis with severe anemia:  Hg slightly higher, at goal  - H/H at low end of acceptable defined as 6.5 to 7.0 range  - transfused 3u pRBCs this admission  - appreciate hematology consult  - still holding home high-dose MS contin as Pt became extremely lethargic with lower blood pressure after receiving here. Con't home oxycodone at half her usual dose.     Abnormal LFTs, imroving: suspect due to sepsis and sickling, con't to monitor  Lymphoma of the stomach:  Follows at [facility]), plans for LN biopsy in the near future, but not currently on treatment  - RLE doppler 2/4 with no DVT     Underweight (Body mass index is 17.17 kg/(m^2)  Code Status: Full    DVT Prophylaxis: Lovenox     Subjective:     Chief Complaint / Reason for Physician Visit  \"Vomiting, nauseous\". Discussed with RN events overnight. Review of Systems:  Symptom Y/N Comments  Symptom Y/N Comments   Fever/Chills n   Chest Pain n    Poor Appetite n   Edema     Cough n   Abdominal Pain n    Sputum    Joint Pain     SOB/DELGADO n   Pruritis/Rash     Nausea/vomit y   Tolerating PT/OT y    Diarrhea    Tolerating Diet n    Constipation    Other       Could NOT obtain due to:      Objective:     VITALS:   Last 24hrs VS reviewed since prior progress note. Most recent are:  Patient Vitals for the past 24 hrs:   Temp Pulse Resp BP SpO2   02/07/18 1110 97.6 °F (36.4 °C) 65 18 136/68 100 %   02/07/18 0720 97.7 °F (36.5 °C) 67 18 (!) 123/95 100 %   02/07/18 0330 - 67 16 132/79 100 %   02/06/18 2221 98.1 °F (36.7 °C) 70 16 126/77 100 %   02/06/18 1902 97.8 °F (36.6 °C) 74 18 123/67 100 %   02/06/18 1747 - 70 - 138/80 -       Intake/Output Summary (Last 24 hours) at 02/07/18 1429  Last data filed at 02/07/18 0636   Gross per 24 hour   Intake                0 ml   Output             1100 ml   Net            -1100 ml        PHYSICAL EXAM:  General: WD, WN. Alert, cooperative, no acute distress    EENT:  EOMI. Anicteric sclerae. MMM  Resp:  CTA bilaterally, no wheezing or rales.   No accessory muscle use  CV:  Regular  rhythm,  +3 RLE edema  GI:  Soft, Non distended, Non tender.  +Bowel sounds  Neurologic:  Alert and oriented X 3, normal speech,   Psych:   Poor insight. Not anxious nor agitated  Skin:  No rashes. No jaundice    Reviewed most current lab test results and cultures  YES  Reviewed most current radiology test results   YES  Review and summation of old records today    NO  Reviewed patient's current orders and MAR    YES  PMH/SH reviewed - no change compared to H&P  ________________________________________________________________________  Care Plan discussed with:    Comments   Patient     Family      RN     Care Manager     Consultant                        Multidiciplinary team rounds were held today with , nursing, pharmacist and clinical coordinator. Patient's plan of care was discussed; medications were reviewed and discharge planning was addressed. ________________________________________________________________________  Total NON critical care TIME:  25   Minutes    Total CRITICAL CARE TIME Spent:   Minutes non procedure based      Comments   >50% of visit spent in counseling and coordination of care     ________________________________________________________________________  Herrera Garcia MD     Procedures: see electronic medical records for all procedures/Xrays and details which were not copied into this note but were reviewed prior to creation of Plan. LABS:  I reviewed today's most current labs and imaging studies.   Pertinent labs include:  Recent Labs      02/07/18 0359 02/06/18   0149  02/05/18   0236   WBC  13.0*  9.9  7.2   HGB  6.6*  6.5*  6.4*   HCT  19.0*  18.5*  19.0*   PLT  399  368  335     Recent Labs      02/07/18   0359  02/06/18   0149  02/05/18   0236   NA  142  146*  147*   K  3.7  4.0  4.1   CL  113*  117*  117*   CO2  22  22  21   GLU  70  82  79   BUN  18  20  23*   CREA  0.76  0.75  0.70   CA  8.2*  8.1*  7.7*   ALB   --   1.9*  1.9*   TBILI   --   1.4*  1.5*   SGOT   --   71*  87*   ALT   --   30  32       Signed: Tae Whitaker, MD

## 2018-02-07 NOTE — PROGRESS NOTES
2/7/2018 4:20 PM    Admit Date: 2/2/2018    Admit Diagnosis: Sepsis ([address]    Subjective:     Mo Rueda   denies chest pain, chest pressure/discomfort, dyspnea, palpitations, irregular heart beats, near-syncope, syncope, fatigue.     Visit Vitals    /71 (BP 1 Location: Left arm, BP Patient Position: At rest)    Pulse 74    Temp 98 °F (36.7 °C)    Resp 18    Ht 5' 4\" (1.626 m)    Wt 100 lb (45.4 kg)    SpO2 100%    BMI 17.17 kg/m2     Current Facility-Administered Medications   Medication Dose Route Frequency    ciprofloxacin HCl (CIPRO) tablet 500 mg  500 mg Oral Q12H    trimethoprim-sulfamethoxazole (BACTRIM DS, SEPTRA DS) 160-800 mg per tablet 1 Tab  1 Tab Oral Q12H    HYDROmorphone (DILAUDID) injection 1 mg  1 mg IntraVENous Q6H PRN    oxyCODONE IR (ROXICODONE) tablet 15 mg  15 mg Oral Q4H PRN    gabapentin (NEURONTIN) capsule 300 mg  300 mg Oral QHS    levalbuterol (XOPENEX) nebulizer soln 1.25 mg/3 mL  1.25 mg Nebulization Q6H PRN    sodium chloride (NS) flush 5-10 mL  5-10 mL IntraVENous Q8H    sodium chloride (NS) flush 5-10 mL  5-10 mL IntraVENous PRN    acetaminophen (TYLENOL) tablet 650 mg  650 mg Oral Q6H PRN    ondansetron (ZOFRAN) injection 4 mg  4 mg IntraVENous Q4H PRN    LORazepam (ATIVAN) injection 1 mg  1 mg IntraVENous C9Q PRN    folic acid (FOLVITE) tablet 1 mg  1 mg Oral DAILY    0.9% sodium chloride infusion 250 mL  250 mL IntraVENous PRN    saline peripheral flush soln 10 mL  10 mL InterCATHeter PRN    SALINE PERIPHERAL FLUSH Q8H soln 10 mL  10 mL InterCATHeter Q8H    amiodarone (CORDARONE) tablet 400 mg  400 mg Oral BID    levETIRAcetam (KEPPRA) tablet 250 mg  250 mg Oral BID         Objective:      Visit Vitals    /71 (BP 1 Location: Left arm, BP Patient Position: At rest)    Pulse 74    Temp 98 °F (36.7 °C)    Resp 18    Ht 5' 4\" (1.626 m)    Wt 100 lb (45.4 kg)    SpO2 100%    BMI 17.17 kg/m2       Physical Exam:  Abdomen: soft, non-tender. Bowel sounds normal.   Extremities: no cyanosis or edema  Heart: regular rate and rhythm, S1, S2 normal, no murmur, click, rub or gallop  Lungs: clear to auscultation bilaterally  Neurologic: Grossly normal  Pulses: 2+     Data Review:   Labs:    Recent Results (from the past 24 hour(s))   CBC WITH AUTOMATED DIFF    Collection Time: 02/07/18  3:59 AM   Result Value Ref Range    WBC 13.0 (H) 3.6 - 11.0 K/uL    RBC 2.61 (L) 3.80 - 5.20 M/uL    HGB 6.6 (L) 11.5 - 16.0 g/dL    HCT 19.0 (L) 35.0 - 47.0 %    MCV 72.8 (L) 80.0 - 99.0 FL    MCH 25.3 (L) 26.0 - 34.0 PG    MCHC 34.7 30.0 - 36.5 g/dL    RDW 19.8 (H) 11.5 - 14.5 %    PLATELET 958 293 - 074 K/uL    MPV 10.8 8.9 - 12.9 FL    NRBC 1.5 (H) 0  WBC    ABSOLUTE NRBC 0.19 (H) 0.00 - 0.01 K/uL    NEUTROPHILS 76 (H) 32 - 75 %    LYMPHOCYTES 13 12 - 49 %    MONOCYTES 8 5 - 13 %    EOSINOPHILS 2 0 - 7 %    BASOPHILS 0 0 - 1 %    IMMATURE GRANULOCYTES 1 (H) 0.0 - 0.5 %    ABS. NEUTROPHILS 9.9 (H) 1.8 - 8.0 K/UL    ABS. LYMPHOCYTES 1.7 0.8 - 3.5 K/UL    ABS. MONOCYTES 1.0 0.0 - 1.0 K/UL    ABS. EOSINOPHILS 0.3 0.0 - 0.4 K/UL    ABS. BASOPHILS 0.0 0.0 - 0.1 K/UL    ABS. IMM.  GRANS. 0.1 (H) 0.00 - 0.04 K/UL    DF AUTOMATED      RBC COMMENTS ANISOCYTOSIS  2+        RBC COMMENTS HYPOCHROMIA  2+        RBC COMMENTS TARGET CELLS  PRESENT        RBC COMMENTS POIKILOCYTOSIS  PRESENT        RBC COMMENTS SICKLE CELLS  PRESENT       METABOLIC PANEL, BASIC    Collection Time: 02/07/18  3:59 AM   Result Value Ref Range    Sodium 142 136 - 145 mmol/L    Potassium 3.7 3.5 - 5.1 mmol/L    Chloride 113 (H) 97 - 108 mmol/L    CO2 22 21 - 32 mmol/L    Anion gap 7 5 - 15 mmol/L    Glucose 70 65 - 100 mg/dL    BUN 18 6 - 20 MG/DL    Creatinine 0.76 0.55 - 1.02 MG/DL    BUN/Creatinine ratio 24 (H) 12 - 20      GFR est AA >60 >60 ml/min/1.73m2    GFR est non-AA >60 >60 ml/min/1.73m2    Calcium 8.2 (L) 8.5 - 10.1 MG/DL       Telemetry: normal sinus rhythm      Assessment: Active Problems:    Sickle cell crisis ([facility] (4/21/2015)      Sickle cell anemia (HCC) (4/21/2015)      Sepsis ([facility] [address] (4/21/2015)      Leg wound, left (2/3/2018)      Abnormal LFTs (2/3/2018)      A-fib ([facility] [address] (2/3/2018)      Multiple myeloma ([facility] [address] (2/3/2018)      Seizures ([facility] [address] (2/3/2018)        Plan:     1. A. Fib with RVR: remais in sinus rhythm. Continue PO amio. No AC due to h/o ICH. 2. Rt. Atrial mass: Cardiac MRI findings d/w Dr. Kimi Vang. Clinically possibility of mass to be malignant lesion is less likely, since clinically and by Echo no significant change over last 2 years. She is not an ideal candidate for cardiac surgery to remove mass, and patient also refusing any priti of surgical intervention. Will repeat cardiac MRI in 3-6 months and hopefully she will cooperate during that study to f/u and better evaluate right atrial mass. Since patient is refusing any surgical intervention will not consult CTS for the time being. Once discharge will f/u as out pt. Also advised patient to f/u regularly, since she never showed up after last consultation. 3. Sickle cell, anemia: getting BT. Hematology consulted. 4. Renal insuff resolved.    5.  Sepsis: per hospitalist. On

## 2018-02-08 VITALS
DIASTOLIC BLOOD PRESSURE: 73 MMHG | SYSTOLIC BLOOD PRESSURE: 144 MMHG | RESPIRATION RATE: 16 BRPM | WEIGHT: 100 LBS | HEART RATE: 68 BPM | BODY MASS INDEX: 17.07 KG/M2 | OXYGEN SATURATION: 100 % | HEIGHT: 64 IN | TEMPERATURE: 98 F

## 2018-02-08 PROCEDURE — 74011250636 HC RX REV CODE- 250/636: Performed by: NURSE PRACTITIONER

## 2018-02-08 PROCEDURE — 74011250637 HC RX REV CODE- 250/637: Performed by: INTERNAL MEDICINE

## 2018-02-08 PROCEDURE — 74011250637 HC RX REV CODE- 250/637: Performed by: PSYCHIATRY & NEUROLOGY

## 2018-02-08 PROCEDURE — 97116 GAIT TRAINING THERAPY: CPT | Performed by: PHYSICAL THERAPIST

## 2018-02-08 PROCEDURE — 74011250637 HC RX REV CODE- 250/637: Performed by: GENERAL ACUTE CARE HOSPITAL

## 2018-02-08 PROCEDURE — 74011250636 HC RX REV CODE- 250/636: Performed by: INTERNAL MEDICINE

## 2018-02-08 RX ORDER — CLINDAMYCIN HYDROCHLORIDE 300 MG/1
300 CAPSULE ORAL EVERY 8 HOURS
Qty: 24 CAP | Refills: 0 | Status: SHIPPED | OUTPATIENT
Start: 2018-02-08 | End: 2018-02-16

## 2018-02-08 RX ORDER — LEVETIRACETAM 250 MG/1
250 TABLET ORAL 2 TIMES DAILY
Qty: 30 TAB | Refills: 1 | Status: SHIPPED | OUTPATIENT
Start: 2018-02-08

## 2018-02-08 RX ORDER — OXYCODONE HYDROCHLORIDE 15 MG/1
15 TABLET ORAL
Qty: 30 TAB | Refills: 0 | Status: SHIPPED | OUTPATIENT
Start: 2018-02-08

## 2018-02-08 RX ORDER — HEPARIN 100 UNIT/ML
300 SYRINGE INTRAVENOUS AS NEEDED
Status: DISCONTINUED | OUTPATIENT
Start: 2018-02-08 | End: 2018-02-08 | Stop reason: HOSPADM

## 2018-02-08 RX ORDER — AMIODARONE HYDROCHLORIDE 400 MG/1
400 TABLET ORAL 2 TIMES DAILY
Qty: 60 TAB | Refills: 1 | Status: SHIPPED | OUTPATIENT
Start: 2018-02-08

## 2018-02-08 RX ORDER — ONDANSETRON 4 MG/1
4 TABLET, FILM COATED ORAL
Qty: 30 TAB | Refills: 1 | Status: SHIPPED | OUTPATIENT
Start: 2018-02-08

## 2018-02-08 RX ORDER — CIPROFLOXACIN 500 MG/1
500 TABLET ORAL EVERY 12 HOURS
Qty: 16 TAB | Refills: 0 | Status: SHIPPED | OUTPATIENT
Start: 2018-02-08 | End: 2018-02-16

## 2018-02-08 RX ADMIN — OXYCODONE HYDROCHLORIDE 15 MG: 5 TABLET ORAL at 04:26

## 2018-02-08 RX ADMIN — LEVETIRACETAM 250 MG: 250 TABLET, FILM COATED ORAL at 09:26

## 2018-02-08 RX ADMIN — ONDANSETRON HYDROCHLORIDE 4 MG: 2 INJECTION, SOLUTION INTRAMUSCULAR; INTRAVENOUS at 06:22

## 2018-02-08 RX ADMIN — Medication 10 ML: at 06:14

## 2018-02-08 RX ADMIN — CLINDAMYCIN HYDROCHLORIDE 300 MG: 150 CAPSULE ORAL at 06:14

## 2018-02-08 RX ADMIN — ONDANSETRON HYDROCHLORIDE 4 MG: 2 INJECTION, SOLUTION INTRAMUSCULAR; INTRAVENOUS at 09:25

## 2018-02-08 RX ADMIN — AMIODARONE HYDROCHLORIDE 400 MG: 200 TABLET ORAL at 09:26

## 2018-02-08 RX ADMIN — OXYCODONE HYDROCHLORIDE 15 MG: 5 TABLET ORAL at 09:26

## 2018-02-08 RX ADMIN — CIPROFLOXACIN HYDROCHLORIDE 500 MG: 500 TABLET, FILM COATED ORAL at 09:26

## 2018-02-08 RX ADMIN — SODIUM CHLORIDE, PRESERVATIVE FREE 300 UNITS: 5 INJECTION INTRAVENOUS at 12:32

## 2018-02-08 RX ADMIN — FOLIC ACID 1 MG: 1 TABLET ORAL at 09:26

## 2018-02-08 NOTE — PROGRESS NOTES
Problem: Mobility Impaired (Adult and Pediatric)  Goal: *Acute Goals and Plan of Care (Insert Text)  Physical Therapy Goals  Initiated 2/6/2018  1. Patient will move from supine to sit and sit to supine  in bed with independence within 7 day(s). 2.  Patient will transfer from bed to chair and chair to bed with modified independence using the least restrictive device within 7 day(s). 3.  Patient will perform sit to stand with modified independence within 7 day(s). 4.  Patient will ambulate with modified independence for 50 feet with the least restrictive device within 7 day(s). physical Therapy TREATMENT  Patient: Riley Mackey (50 y.o. female)  Date: 2/8/2018  Diagnosis: Sepsis ([facility] [address] <principal problem not specified>       Precautions: Fall, Contact  Chart, physical therapy assessment, plan of care and goals were reviewed. ASSESSMENT:  Patient improving with mobility, balance. Patient expecting to be discharged this afternoon. Bed mobility independent with use of bed rails. Good sitting balance EOB. Sit to stand with CGA. Patient ambulated 24' with rolling walker and CGA. Patient ambulating on right toes with decreased step clearance and path deviations noted. Patient stays too far back from walker but improves with cues. Patient rested once up in chair. Assisted patient with LE dressing. Up in chair at end of session. Patient cannot find shoes; called ED but they did not see them in lost and found. Recommend HHPT at discharge. Progression toward goals:  [x]    Improving appropriately and progressing toward goals  []    Improving slowly and progressing toward goals  []    Not making progress toward goals and plan of care will be adjusted     PLAN:  Patient continues to benefit from skilled intervention to address the above impairments. Continue treatment per established plan of care.   Discharge Recommendations:  Home Health  Further Equipment Recommendations for Discharge:  none SUBJECTIVE:   Patient stated I'm hoping to leave soon.     OBJECTIVE DATA SUMMARY:   Critical Behavior:  Neurologic State: Alert  Orientation Level: Oriented X4  Cognition: Follows commands     Functional Mobility Training:  Bed Mobility:  Rolling: Modified independent  Supine to Sit: Modified independent     Scooting: Independent        Transfers:  Sit to Stand: Contact guard assistance  Stand to Sit: Contact guard assistance                             Balance:  Sitting: Intact  Standing: Impaired  Standing - Static: Good;Constant support  Standing - Dynamic : Fair  Ambulation/Gait Training:  Distance (ft): 24 Feet (ft)  Assistive Device: Gait belt;Walker, rolling  Ambulation - Level of Assistance: Contact guard assistance        Gait Abnormalities: Toe walking;Decreased step clearance; Path deviations        Base of Support: Narrowed     Speed/Yamila: Pace decreased (<100 feet/min)  Step Length: Left shortened;Right shortened                                 Pain:  Pain Scale 1: Numeric (0 - 10)  Pain Intensity 1: 0  Pain Location 1: Leg  Pain Orientation 1: Right;Left  Pain Description 1: Aching;Constant  Pain Intervention(s) 1: Medication (see MAR)  Activity Tolerance:   improving  Please refer to the flowsheet for vital signs taken during this treatment.   After treatment:   [x]    Patient left in no apparent distress sitting up in chair  []    Patient left in no apparent distress in bed  [x]    Call bell left within reach  [x]    Nursing notified  []    Caregiver present  []    Bed alarm activated    COMMUNICATION/COLLABORATION:   The patients plan of care was discussed with: Registered Nurse    Chayo Barrow, PT   Time Calculation: 16 mins

## 2018-02-08 NOTE — PROGRESS NOTES
Spiritual Care Assessment/Progress Notes    Nellie Parikh [id number]  [id number]    1975  36 y.o.  female    Patient Telephone Number: [phone] (home)   Faith Affiliation: No Rastafari   Language: English   Extended Emergency Contact Information  Primary Emergency Contact: Shelby Ontiveros Phone: [phone]  Relation: Other Relative   Patient Active Problem List    Diagnosis Date Noted    Leg wound, left 02/03/2018    Abnormal LFTs 02/03/2018    A-fib ([facility] [address] 02/03/2018    Multiple myeloma ([facility] [address] 02/03/2018    Seizures ([facility] [address] 02/03/2018    Cardiac mass 04/24/2015    Sickle cell crisis ([facility] [address] 04/21/2015    Sickle cell anemia ([facility] [address] 04/21/2015    Sepsis ([facility] [address] 04/21/2015    SIRS (systemic inflammatory response syndrome) ([facility] [address] 04/21/2015        Date: 2/8/2018       Level of Faith/Spiritual Activity:  [x]         Involved in emily tradition/spiritual practice    []         Not involved in emily tradition/spiritual practice  [x]         Spiritually oriented    []         Claims no spiritual orientation    []         seeking spiritual identity  []         Feels alienated from Episcopalian practice/tradition  []         Feels angry about Episcopalian practice/tradition  [x]         Spirituality/Episcopalian tradition is a resource for coping at this time.   []         Not able to assess due to medical condition    Services Provided Today:  []         crisis intervention    []         reading Scriptures  [x]         spiritual assessment    [x]         prayer  [x]         empathic listening/emotional support  []         rites and rituals (cite in comments)  []         life review     []         Episcopalian support  []         theological development    []         advocacy  []         ethical dialog     []         blessing  []         bereavement support    []         support to family  []         anticipatory grief support   []         help with AMD  []         spiritual guidance    [] meditation      Spiritual Care Needs  [x]         Emotional Support  []         Spiritual/Congregational Care  []         Loss/Adjustment  []         Advocacy/Referral                /Ethics  []         No needs expressed at               this time  []         Other: (note in               comments)  [address]  []         Follow up visits with               pt/family  []         Provide materials  []         Schedule sacraments  []         Contact Community               Clergy  [x]         Follow up as needed  []         Other: (note in               comments)     Comments:   Supportive visit on PCU for spiritual assessment. No visitors present. Provided empathic listening and emotional support as patient shared briefly about events leading to her hospitalization. Patient shared that she has several stressors in her life, including raising three children, with what seems to be a limited support system. She described her mother as a strong independent woman who is supportive and she has a brother who helps out as able. She also has a sister but the sister doesn't drive. Patient also shared she has very few friends. She attends [employer] and alluded several times to a belief that God does not give a person more than they can bear. Offered assurance of prayer and advised her of  availability.   Chantel Santos, MPS, [address]    [address] Paging Service  [phone])

## 2018-02-08 NOTE — DISCHARGE SUMMARY
Hospitalist Discharge Summary     Patient ID:  Toni Granger  [id number]  36 y.o.  1975    PCP on record: None    Admit date: 2/2/2018  Discharge date and time: 2/8/2018      DISCHARGE DIAGNOSIS:  Septic shock due to left leg infection in setting of chronic LLE wound, present on admission, resolved  Nausea, vomiting, resolving  Possible seizures with h/o ICH and remote CVA  Afib with RVR and history of R atrial mass  Sickle cell crisis with severe anemia  Abnormal LFTs, improving, sec to sepsis  Lymphoma of the stomach    CONSULTATIONS:  IP CONSULT TO NEUROLOGY  IP CONSULT TO HEMATOLOGY  IP CONSULT TO CARDIOLOGY    Excerpted HPI from H&P of Alicia Hubbard MD:  Toni Granger is a 36 y.o.  female who presents with seizure to ED. I am not able to reach the family to discuss the details of seizures. Pt is currently sleepy under influence of ativan which she received in ED but able to provide some info. She doesn't remember what happened. She does report prior history of seizure, a.fib. Pt reported chronic left leg wound for past year. Pt does report pain all over the body. denies any fever, chills, nausea, vomiting, diarrhea, cough, shortness of breath. In ED pt noted to be in a.fib with RVR, hypotensive, leukocytosis. ______________________________________________________________________  DISCHARGE SUMMARY/HOSPITAL COURSE:  for full details see H&P, daily progress notes, labs, consult notes. Septic shock due to left leg infection in setting of chronic LLE wound, present on admission, resolved  - CXR with mild pulmonary edema pattern  - wound cultures heavy sensitive pseudomonas and heavy quinolone resistant MSSA  -Was initially on Cefepime, Flagyl and Vanco - then switched to Cipro and Bactrim and now will give rx for Cipro and Clinda for 14 days total  - wound care: Cleanse the left lower leg wound with NS, wiping the wound bed with 4x4's to remove the old drainage.  Apply a Xeroform dressing and cover with an ABD and wrap with Miller    Pt stable for discharge home with home health. Pt has refused to go to SNF - states that she has great family support and that she would be happier there. Nausea, vomiting, resolving  -Pt was on Bactrim but stated afterward that she does not normally tolerate it. Discovered this yesterday evening so Bactrim was discontinued and pt was started on Clindamycin  -Pt tolerating breakfast this morning - soft mechanical diet which her brother went shopping and bought groceries yesterday  -Will give rx for Zofran PRN as well      Possible seizures with h/o ICH and remote CVA:  None since admission, no history of seizure.  Per family seizure lasted a few seconds, after Pt was post-ictal with right sided deficits and slurred speech per EMS. - CT head with no acute intracranial process. 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 - neuro consult appreciated  - EEG: Abnormal. Moderate diffuse cerebral dysfunction which is non specific for etiology but can be seen in toxic/metabolic states. No seizures. Clinical correlation recommended. - con't low-dose keppra with prn ativan for now - plan to dc on Keppra 250mg BID as per Neuro      Afib with RVR and history of R atrial mass: converted to NSR shortly after admission  - echo with EF 60-65%. There were no regional wall motion abnormalities.   - con't oral amiodarone - pt was restarted on Xarelto upon admission here - however due to history of ICH, Cardio rec is to discontinue it -- Discussed with pt and she is aware that Xarelto should be stopped  - Appreciate cardiology consult  - cardiac MRI completed - pt to follow up with results with Cardiology at outpatient      Sickle cell crisis with severe anemia:  Hg slightly higher, at goal  - H/H at low end of acceptable defined as 6.5 to 7.0 range  - transfused 3u pRBCs this admission  - appreciate hematology consult  - still holding home high-dose MS contin as Pt became extremely lethargic with lower blood pressure after receiving here. Con't home oxycodone at half her usual dose.      Abnormal LFTs, improving, sec to sepsis: suspect due to sepsis and sickling, con't to monitor  Lymphoma of the stomach:  Follows at [facility]), plans for LN biopsy in the near future, but not currently on treatment  - RLE doppler 2/4 with no DVT  _______________________________________________________________________  Patient seen and examined by me on discharge day. Pertinent Findings:  Gen:    Not in distress, frail  Chest: Clear lungs  CVS:   Regular rhythm. No edema  Abd:  Soft, not distended, not tender  Neuro:  Alert, oriented x3  _______________________________________________________________________  DISCHARGE MEDICATIONS:   Current Discharge Medication List      START taking these medications    Details   amiodarone (PACERONE) 400 mg tablet Take 1 Tab by mouth two (2) times a day. Qty: 60 Tab, Refills: 1      ciprofloxacin HCl (CIPRO) 500 mg tablet Take 1 Tab by mouth every twelve (12) hours for 8 days. Qty: 16 Tab, Refills: 0      clindamycin (CLEOCIN) 300 mg capsule Take 1 Cap by mouth every eight (8) hours for 8 days. Qty: 24 Cap, Refills: 0      levETIRAcetam (KEPPRA) 250 mg tablet Take 1 Tab by mouth two (2) times a day. Qty: 30 Tab, Refills: 1         CONTINUE these medications which have CHANGED    Details   oxyCODONE IR (OXY-IR) 15 mg immediate release tablet Take 1 Tab by mouth every four (4) hours as needed. Max Daily Amount: 90 mg.  Qty: 30 Tab, Refills: 0    Associated Diagnoses: Sickle cell crisis ([address]         CONTINUE these medications which have NOT CHANGED    Details   gabapentin (NEURONTIN) 300 mg capsule Take 300 mg by mouth nightly. folic acid (FOLVITE) 1 mg tablet Take 1 mg by mouth daily.          STOP taking these medications       rivaroxaban (XARELTO) 20 mg tab tablet Comments:   Reason for Stopping:         morphine CR (MS CONTIN) 100 mg CR tablet Comments:   Reason for Stopping:               My Recommended Diet, Activity, Wound Care, and follow-up labs are listed in the patient's Discharge Insturctions which I have personally completed and reviewed.     ______________________________________________________________________    Risk of deterioration: Moderate    Condition at Discharge:  Stable  ______________________________________________________________________    Disposition  Home with family and home health services  ______________________________________________________________________    Care Plan discussed with:   Patient, Family, RN, Care Manager, Consultant    ______________________________________________________________________    Code Status: Full Code  ______________________________________________________________________      Follow up with:   PCP : None  Follow-up Information     Follow up With Details Comments Contact Christen Lao MD On 2/12/2018 For new patient/ hospital follow up appointment at 12:15PM  [address]      Martin Mckoy MD In 1 month  [address] [facility]  [phone]      None   None (395) Patient stated that they have no PCP                Total time in minutes spent coordinating this discharge (includes going over instructions, follow-up, prescriptions, and preparing report for sign off to her PCP) :  35 minutes    Signed:  Cole Zendejas MD

## 2018-02-08 NOTE — DISCHARGE INSTRUCTIONS
Sickle Cell Crisis: Care Instructions  Your Care Instructions    Sickle cell crisis is a painful episode that may begin suddenly in a person with sickle cell disease. Sickle cell disease turns normal, round red blood cells into cells that look like kendra or crescent moons. The sickle-shaped cells can get stuck in blood vessels, blocking blood flow and causing severe pain. The pain can occur in the bones of the spine, the arms and legs, the chest, and the abdomen. An episode may be called a \"painful event\" or \"painful crisis. \" Some people who have sickle cell disease have many painful events, while others have few or none. Treatment depends on the level of pain and how long it lasts. Sometimes taking nonprescription pain relievers can help. Or you may need stronger pain relief medicine that is prescribed or given by a doctor. You may need to be treated in the hospital.  It isn't always possible to know what sets off a painful event. But triggers include being dehydrated, cold temperatures, infection, stress, and not getting enough oxygen. Follow-up care is a key part of your treatment and safety. Be sure to make and go to all appointments, and call your doctor if you are having problems. It's also a good idea to know your test results and keep a list of the medicines you take. How can you care for yourself at home? · Create a pain management plan with your doctor. This plan should include the types of medicines you can take and other actions you can take at home to relieve pain. · Drink plenty of fluids, enough so that your urine is light yellow or clear like water. If you have kidney, heart, or liver disease and have to limit fluids, talk with your doctor before you increase the amount of fluids you drink. · Take your medicines exactly as prescribed. Call your doctor if you think you are having a problem with your medicine. · Take pain medicines exactly as directed.   ¨ If the doctor gave you a prescription medicine for pain, take it as prescribed. ¨ If you are not taking a prescription pain medicine, ask your doctor if you can take an over-the-counter medicine. · Avoid alcohol. It can make you dehydrated. · Dress warmly in cold weather. The cold and windy weather can lead to severe pain. · Do not smoke. Smoking can reduce the amount of oxygen in your blood. · Get plenty of sleep. When should you call for help? Call 911 anytime you think you may need emergency care. For example, call if:  ? · You have symptoms of a severe problem from sickle cell. ? · You have symptoms of a stroke. These may include:  ¨ Sudden numbness, tingling, weakness, or loss of movement in your face, arm, or leg, especially on only one side of your body. ¨ Sudden vision changes. ¨ Sudden trouble speaking. ¨ Sudden confusion or trouble understanding simple statements. ¨ Sudden problems with walking or balance. ¨ A sudden, severe headache that is different from past headaches. ? · You are in severe pain. ? · You have symptoms of a heart attack. These may include:  ¨ Chest pain or pressure, or a strange feeling in the chest.  ¨ Sweating. ¨ Shortness of breath. ¨ Nausea or vomiting. ¨ Pain, pressure, or a strange feeling in the back, neck, jaw, or upper belly or in one or both shoulders or arms. ¨ Lightheadedness or sudden weakness. ¨ A fast or irregular heartbeat. After you call 911, the  may tell you to chew 1 adult-strength or 2 to 4 low-dose aspirin. Wait for an ambulance. Do not try to drive yourself. ?Call your doctor now or seek immediate medical care if:  ? · You have a fever. ? Watch closely for changes in your health, and be sure to contact your doctor if you have any problems. Where can you learn more? Go to http://leni-sydney.info/. Enter F104 in the search box to learn more about \"Sickle Cell Crisis: Care Instructions. \"  Current as of: October 13, 2016  Content Version: 11.4  © 1480-0281 Healthwise, [employer]. Care instructions adapted under license by [employer] (which disclaims liability or warranty for this information). If you have questions about a medical condition or this instruction, always ask your healthcare professional. [address] any warranty or liability for your use of this information. "[employer]," Activation    Thank you for requesting access to "[employer],". Please follow the instructions below to securely access and download your online medical record. "Carmolex," allows you to send messages to your doctor, view your test results, renew your prescriptions, schedule appointments, and more. How Do I Sign Up? 1. In your internet browser, go to [email or web address]  2. Click on the First Time User? Click Here link in the Sign In box. You will be redirect to the New Member Sign Up page. 3. Enter your "[employer]," Access Code exactly as it appears below. You will not need to use this code after youve completed the sign-up process. If you do not sign up before the expiration date, you must request a new code. "[employer]," Access Code: [address]  Expires: 2018 11:44 AM (This is the date your "C[employer]," access code will )    4. Enter the last four digits of your Social Security Number (xxxx) and Date of Birth (mm/dd/yyyy) as indicated and click Submit. You will be taken to the next sign-up page. 5. Create a "Carmolex," ID. This will be your "C[employer]x," login ID and cannot be changed, so think of one that is secure and easy to remember. 6. Create a "Carm[employer]x," password. You can change your password at any time. 7. Enter your Password Reset Question and Answer. This can be used at a later time if you forget your password. 8. Enter your e-mail address. You will receive e-mail notification when new information is available in [address] 9. Click Sign Up. You can now view and download portions of your medical record.   10. Click the Download Summary menu link to download a portable copy of your medical information. Additional Information    If you have questions, please visit the Frequently Asked Questions section of the [employer] website at [email or web address]. [employer]. [employer]/. Remember, [employer] is NOT to be used for urgent needs. For medical emergencies, dial 911.

## 2018-02-08 NOTE — PROGRESS NOTES
PCU SHIFT NURSING NOTE      Bedside shift change report given to Evelyn Altamirano RN (oncoming nurse) by Delia Urrutia (offgoing nurse). Report included the following information SBAR, Kardex, Intake/Output, Recent Results and Cardiac Rhythm NSR. Shift Summary:   1950: Assessment complete. Patient alert and oriented. C/o 8/10 BL leg pain. Patient refusing bed alarm. Will monitor   1947: PRN Dilaudid administered for pain   2130: PRN Joy administered. Patient requesting night medications to be given at 2200.   2205: Patient c/o nausea. PRN zofran administered   0430: Patient up with 1 assist to bedside commode. PRN Joy administered   0630:Patient up to bedside commode. 1 episode of emesis. Pill noted in patients emesis. 0710: MD gutierrez. Discussed nights events. Bed alarm refusal sheet signed. Report given to [facility]. Call bell in reach       Admission Date 2/2/2018   Admission Diagnosis Sepsis ([facility] [address] [facility]   Consults IP CONSULT TO NEUROLOGY  IP CONSULT TO HEMATOLOGY  IP CONSULT TO CARDIOLOGY        Consults   []PT   []OT   []Speech   []Case Management      [] Palliative      Cardiac Monitoring Order   []Yes   []No     IV drips   []Yes    Drip:                            Dose:  Drip:                            Dose:  Drip:                            Dose:   []No     GI Prophylaxis   []Yes   []No         DVT Prophylaxis   SCDs:             Nico stockings:         [] Medication   []Contraindicated   []None      Activity Level Activity Level: Up with Assistance     Activity Assistance: Partial (one person)   Purposeful Rounding every 1-2 hour? []Yes   Goldstein Score  Total Score: 3   Bed Alarm (If score 3 or >)   []Yes   [] Refused (See signed refusal form in chart)   Shashi Score  Shashi Score: 17   Shashi Score (if score 14 or less)   []PMT consult   []Wound Care consult      []Specialty bed   [] Nutrition consult          Needs prior to discharge:   Home O2 required:    []Yes   []No    If yes, how much O2 required?     Other: Last Bowel Movement: Last Bowel Movement Date: 02/06/18      Influenza Vaccine Received Flu Vaccine for Current Season (usually Sept-March): Yes        Pneumonia Vaccine           Diet Active Orders   Diet    DIET REGULAR      LDAs             Venous Access Device Porticath 02/02/18 Upper chest (subclavicular area, right (Active)   Central Line Being Utilized Yes 2/7/2018  3:46 PM   Criteria for Appropriate Use Limited/no vessel suitable for conventional peripheral access 2/7/2018  3:46 PM   Site Assessment Clean, dry, & intact 2/7/2018  3:46 PM   Date of Last Dressing Change 02/02/18 2/7/2018  3:46 PM   Dressing Status Clean, dry, & intact 2/7/2018  3:46 PM   Dressing Type Transparent 2/7/2018  3:46 PM   Action Taken Open ports on tubing capped 2/7/2018  3:30 AM   Alcohol Cap Used Yes 2/7/2018  3:30 AM      Peripheral IV 02/02/18 Left Arm (Active)   Site Assessment Clean, dry, & intact 2/7/2018  3:46 PM   Phlebitis Assessment 0 2/7/2018  3:46 PM   Infiltration Assessment 0 2/7/2018  3:46 PM   Dressing Status Clean, dry, & intact 2/7/2018  3:46 PM   Dressing Type Transparent 2/7/2018  3:46 PM   Hub Color/Line Status Pink 2/7/2018  3:46 PM   Action Taken Open ports on tubing capped 2/7/2018  3:30 AM   Alcohol Cap Used Yes 2/7/2018  3:30 AM                      Urinary Catheter [REMOVED] Urinary Catheter 02/03/18 Martinez-Criteria for Appropriate Use: Strict I/Os, Medically/surgically unstable    Intake & Output   Date 02/06/18 1900 - 02/07/18 0659 02/07/18 0700 - 02/08/18 0659   Shift 6396-5994 24 Hour Total 2529-2486 0731-1876 24 Hour Total   I  N  T  A  K  E   Shift Total  (mL/kg)        O  U  T  P  U  T   Urine  (mL/kg/hr) 1100 1100         Urine Voided 1100 1100         Urine Occurrence(s)  2 x       Emesis/NG output           Emesis Occurrence(s)   5 x  5 x    Stool           Stool Occurrence(s)  1 x       Shift Total  (mL/kg) 1100  (24.3) 1100  (24.3)      NET -1100 -1100      Weight (kg) 45.4 45.4 45.4 45.4 45.4 Readmission Risk Assessment Tool Score Low Risk            12       Total Score        3 Has Seen PCP in Last 6 Months (Yes=3, No=0)    9 Pt. Coverage (Medicare=5 , Medicaid, or Self-Pay=4)        Criteria that do not apply:    . Living with Significant Other. Assisted Living. LTAC. SNF.  or   Rehab    Patient Length of Stay (>5 days = 3)    IP Visits Last 12 Months (1-3=4, 4=9, >4=11)    Charlson Comorbidity Score (Age + Comorbid Conditions)       Expected Length of Stay 4d 21h   Actual Length of Stay 4

## 2018-02-08 NOTE — PROGRESS NOTES
Spoke with md and patient is being discharged today. Patient aware and in agreement. [facility] and they are aware she is being discharged today. FOC signed and placed on chart. Patient states her brother is on the way to pick her up. Orders for therapy and nursing [facility]ed to [facility] in addition to discharge summary .

## 2018-02-09 LAB
BACTERIA SPEC CULT: NORMAL
BACTERIA SPEC CULT: NORMAL
SERVICE CMNT-IMP: NORMAL
SERVICE CMNT-IMP: NORMAL

## 2018-02-09 NOTE — PROGRESS NOTES
reviewd discharge instructions in detail and called for volunteer . Pt's brother picked her up to go home and brought her tennis shoes in from home.

## 2020-05-15 NOTE — IP AVS SNAPSHOT
Summary of Care Report The Summary of Care report has been created to help improve care coordination. Users with access to [employer] or [employer] [address] (Web-based application) may access additional patient information including the Discharge Summary. If you are not currently a [employer] [facility] user and need more information, please call the number listed below in the [place] [address] section and ask to be connected with Medical Records. Facility Information Name Address Phone [address] [address] [phone] Patient Information Patient Name Sex ADRIA Nesbitt ([id number]) Female 1975 Discharge Information Admitting Provider Service Area Unit Trisha Redding MD / [facility] / [phone] Discharge Provider Discharge Date/Time Discharge Disposition Destination (none) 2018 (Pending) [facility] (none) Patient Language Language ENGLISH [13] Hospital Problems as of 2018  Reviewed: 2018 10:37 AM by Jenaro Marc MD  
  
  
  
 Class Noted - Resolved Last Modified POA Active Problems Sickle cell crisis ([facility] [address]  2015 - Present 2/3/2018 by Trisha Redding MD Yes Entered by Nivia Schreiber MD  
  Sickle cell anemia ([address]  2015 - Present 2/3/2018 by Trisha Redding MD Yes Entered by Nivia Schreiber MD  
  Sepsis ([facility] [address]  2015 - Present 2/3/2018 by Trisha Redding MD Unknown Entered by Nivia Schreiber MD  
  Leg wound, left  2/3/2018 - Present 2/3/2018 by Trisha Redding MD Unknown Entered by Trisha Redding MD  
  Abnormal LFTs  2/3/2018 - Present 2/3/2018 by Trisha Redding MD Unknown Entered by Trisha Redding MD  
  A-fib [facility])  2/3/2018 - Present 2/3/2018 by Trisha Redding MD Unknown   Entered by Trisha Redding MD  
 Multiple myeloma ([facility]  2/3/2018 - Present 2/3/2018 by Alicia Hubbard MD Unknown Entered by Alicia Hubbard MD  
  Seizures ([facility]  2/3/2018 - Present 2/3/2018 by Alicia Hubbard MD Unknown Entered by Alicia Hubbard MD  
  
Non-Hospital Problems as of 2/8/2018  Reviewed: 2/8/2018 10:37 AM by Emigdio Mayers MD  
  
  
  
 Class Noted - Resolved Last Modified Active Problems SIRS (systemic inflammatory response syndrome) ([facility]  4/21/2015 - Present 4/21/2015 by Kwame Hodge MD  
  Entered by Kwame Hodge MD  
  Cardiac mass  4/24/2015 - Present 4/24/2015 by Jeanne Leos MD  
  Entered by Jeanne Leos MD  
  
You are allergic to the following Allergen Reactions Pcn (Penicillins) Hives Bactrim (Sulfamethoxazole-Trimethoprim) Nausea and Vomiting Silvadene (Silver Sulfadiazine) Hives Zantac (Ranitidine Hcl) Itching Current Discharge Medication List  
  
START taking these medications Dose & Instructions Dispensing Information Comments  
 amiodarone 400 mg tablet Commonly known as:  Beola Kowalski Dose:  400 mg Take 1 Tab by mouth two (2) times a day. Quantity:  60 Tab Refills:  1  
   
 ciprofloxacin HCl 500 mg tablet Commonly known as:  CIPRO Dose:  500 mg Take 1 Tab by mouth every twelve (12) hours for 8 days. Quantity:  16 Tab Refills:  0  
   
 clindamycin 300 mg capsule Commonly known as:  CLEOCIN Dose:  300 mg Take 1 Cap by mouth every eight (8) hours for 8 days. Quantity:  24 Cap Refills:  0  
   
 levETIRAcetam 250 mg tablet Commonly known as:  KEPPRA Dose:  250 mg Take 1 Tab by mouth two (2) times a day. Quantity:  30 Tab Refills:  1  
   
 ondansetron hcl 4 mg tablet Commonly known as:  ZOFRAN (AS HYDROCHLORIDE) Dose:  4 mg Take 1 Tab by mouth every six (6) hours as needed for Nausea. Quantity:  30 Tab Refills:  1 CONTINUE these medications which have CHANGED Dose & Instructions Dispensing Information Comments  
 oxyCODONE IR 15 mg immediate release tablet Commonly known as:  OXY-IR What changed:   
- medication strength 
- how much to take 
- reasons to take this Dose:  15 mg Take 1 Tab by mouth every four (4) hours as needed. Max Daily Amount: 90 mg. Quantity:  30 Tab Refills:  0 CONTINUE these medications which have NOT CHANGED Dose & Instructions Dispensing Information Comments  
 folic acid 1 mg tablet Commonly known as:  Idania Arnold Dose:  1 mg Take 1 mg by mouth daily. Refills:  0  
   
 gabapentin 300 mg capsule Commonly known as:  NEURONTIN Dose:  300 mg Take 300 mg by mouth nightly. Refills:  0 STOP taking these medications Comments  
 morphine  mg CR tablet Commonly known as:  MS CONTIN  
   
   
 rivaroxaban 20 mg Tab tablet Commonly known as:  [place]rhiannon Montemayor Follow-up Information Follow up With Details Comments Contact Info Dayday Lao MD Go on 2/12/2018 For new patient/ hospital follow up appointment at 12:15PM  [address] [facility] 
[phone] Martin Mckoy MD Go on 3/8/2018 For hospital follow up appointment at 3:00PM  [address] [facility] 
[phone] [address] will see you for pt/ot/nursing and aide when discharged. [phone] Discharge Instructions Sickle Cell Crisis: Care Instructions Your Care Instructions Sickle cell crisis is a painful episode that may begin suddenly in a person with sickle cell disease. Sickle cell disease turns normal, round red blood cells into cells that look like kendra or crescent moons. The sickle-shaped cells can get stuck in blood vessels, blocking blood flow and causing severe pain. The pain can occur in the bones of the spine, the arms and legs, the chest, and the abdomen. An episode may be called a \"painful event\" or \"painful crisis. \" Some people who have sickle cell disease have many painful events, while others have few or none. Treatment depends on the level of pain and how long it lasts. Sometimes taking nonprescription pain relievers can help. Or you may need stronger pain relief medicine that is prescribed or given by a doctor. You may need to be treated in the hospital. 
It isn't always possible to know what sets off a painful event. But triggers include being dehydrated, cold temperatures, infection, stress, and not getting enough oxygen. Follow-up care is a key part of your treatment and safety. Be sure to make and go to all appointments, and call your doctor if you are having problems. It's also a good idea to know your test results and keep a list of the medicines you take. How can you care for yourself at home? · Create a pain management plan with your doctor. This plan should include the types of medicines you can take and other actions you can take at home to relieve pain. · Drink plenty of fluids, enough so that your urine is light yellow or clear like water. If you have kidney, heart, or liver disease and have to limit fluids, talk with your doctor before you increase the amount of fluids you drink. · Take your medicines exactly as prescribed. Call your doctor if you think you are having a problem with your medicine. · Take pain medicines exactly as directed. ¨ If the doctor gave you a prescription medicine for pain, take it as prescribed. ¨ If you are not taking a prescription pain medicine, ask your doctor if you can take an over-the-counter medicine. · Avoid alcohol. It can make you dehydrated. · Dress warmly in cold weather. The cold and windy weather can lead to severe pain. · Do not smoke. Smoking can reduce the amount of oxygen in your blood. · Get plenty of sleep. When should you call for help? Call 911 anytime you think you may need emergency care. For example, call if: 
? · You have symptoms of a severe problem from sickle cell. ? · You have symptoms of a stroke. These may include: 
¨ Sudden numbness, tingling, weakness, or loss of movement in your face, arm, or leg, especially on only one side of your body. ¨ Sudden vision changes. ¨ Sudden trouble speaking. ¨ Sudden confusion or trouble understanding simple statements. ¨ Sudden problems with walking or balance. ¨ A sudden, severe headache that is different from past headaches. ? · You are in severe pain. ? · You have symptoms of a heart attack. These may include: ¨ Chest pain or pressure, or a strange feeling in the chest. 
¨ Sweating. ¨ Shortness of breath. ¨ Nausea or vomiting. ¨ Pain, pressure, or a strange feeling in the back, neck, jaw, or upper belly or in one or both shoulders or arms. ¨ Lightheadedness or sudden weakness. ¨ A fast or irregular heartbeat. After you call 911, the  may tell you to chew 1 adult-strength or 2 to 4 low-dose aspirin. Wait for an ambulance. Do not try to drive yourself. ?Call your doctor now or seek immediate medical care if: 
? · You have a fever. ? Watch closely for changes in your health, and be sure to contact your doctor if you have any problems. Where can you learn more? Go to http://leni-sydney.info/. Enter F104 in the search box to learn more about \"Sickle Cell Crisis: Care Instructions. \" Current as of: October 13, 2016 Content Version: 11.4 © 6110-8246 [employer]. Care instructions adapted under license by [employer] (which disclaims liability or warranty for this information). If you have questions about a medical condition or this instruction, always ask your healthcare professional. [place] [address] any warranty or liability for your use of this information. MyChart Activation Detail Level: Simple Thank you for requesting access to [employer]. Please follow the instructions below to securely access and download your online medical record. [employer] allows you to send messages to your doctor, view your test results, renew your prescriptions, schedule appointments, and more. How Do I Sign Up? 1. In your internet browser, go to [email or web address] 
2. Click on the First Time User? Click Here link in the Sign In box. You will be redirect to the New Member Sign Up page. 3. Enter your [employer] Access Code exactly as it appears below. You will not need to use this code after youve completed the sign-up process. If you do not sign up before the expiration date, you must request a new code. [employer] Access Code: [address] Expires: 2018 11:44 AM (This is the date your [employer] access code will ) 4. Enter the last four digits of your Social Security Number (xxxx) and Date of Birth (mm/dd/yyyy) as indicated and click Submit. You will be taken to the next sign-up page. 5. Create a [employer] ID. This will be your [employer] login ID and cannot be changed, so think of one that is secure and easy to remember. 6. Create a [employer] password. You can change your password at any time. 7. Enter your Password Reset Question and Answer. This can be used at a later time if you forget your password. 8. Enter your e-mail address. You will receive e-mail notification when new information is available in [address] 9. Click Sign Up. You can now view and download portions of your medical record. 10. Click the Download Summary menu link to download a portable copy of your medical information. Additional Information If you have questions, please visit the Frequently Asked Questions section of the [employer] website at [email or web address] Remember, [employer] is NOT to be used for urgent needs. For medical emergencies, dial 911. Chart Review Routing History Recipient Method Report Sent By Moises Gonzalez MD  
Fax: [phone] Phone: [phone] Fax Tammie Bell MD NOTES AUTO ROUTING REPORT Ventura Chacko MD [[id number]] 4/22/2015 12:10 AM 04/22/2015